# Patient Record
Sex: MALE | Race: WHITE | Employment: OTHER | URBAN - METROPOLITAN AREA
[De-identification: names, ages, dates, MRNs, and addresses within clinical notes are randomized per-mention and may not be internally consistent; named-entity substitution may affect disease eponyms.]

---

## 2017-01-01 ENCOUNTER — HOSPITAL ENCOUNTER (INPATIENT)
Age: 77
LOS: 2 days | Discharge: HOME | DRG: 812 | End: 2017-03-26
Attending: INTERNAL MEDICINE | Admitting: INTERNAL MEDICINE
Payer: MEDICARE

## 2017-01-01 ENCOUNTER — HOSPITAL ENCOUNTER (OUTPATIENT)
Dept: WOUND CARE | Age: 77
Discharge: HOME | End: 2017-07-24
Attending: PHYSICAL MEDICINE & REHABILITATION
Payer: MEDICARE

## 2017-01-01 ENCOUNTER — HOSPITAL ENCOUNTER (OUTPATIENT)
Dept: LAB | Age: 77
Discharge: HOME | DRG: 308 | End: 2017-09-05
Attending: INTERNAL MEDICINE
Payer: MEDICARE

## 2017-01-01 ENCOUNTER — HOSPITAL ENCOUNTER (INPATIENT)
Age: 77
LOS: 1 days | Discharge: HOME | DRG: 308 | End: 2017-09-09
Attending: INTERNAL MEDICINE | Admitting: INTERNAL MEDICINE
Payer: MEDICARE

## 2017-01-01 ENCOUNTER — HOSPITAL ENCOUNTER (OUTPATIENT)
Dept: WOUND CARE | Age: 77
Discharge: HOME | End: 2017-08-28
Attending: PHYSICAL MEDICINE & REHABILITATION
Payer: MEDICARE

## 2017-01-01 ENCOUNTER — APPOINTMENT (OUTPATIENT)
Dept: GENERAL RADIOLOGY | Age: 77
DRG: 308 | End: 2017-01-01
Attending: NURSE PRACTITIONER
Payer: MEDICARE

## 2017-01-01 VITALS
HEART RATE: 70 BPM | RESPIRATION RATE: 16 BRPM | HEIGHT: 67 IN | SYSTOLIC BLOOD PRESSURE: 83 MMHG | WEIGHT: 219.1 LBS | TEMPERATURE: 96.7 F | DIASTOLIC BLOOD PRESSURE: 51 MMHG | OXYGEN SATURATION: 99 % | BODY MASS INDEX: 34.39 KG/M2

## 2017-01-01 VITALS
DIASTOLIC BLOOD PRESSURE: 67 MMHG | BODY MASS INDEX: 34.75 KG/M2 | TEMPERATURE: 96.8 F | RESPIRATION RATE: 18 BRPM | OXYGEN SATURATION: 96 % | SYSTOLIC BLOOD PRESSURE: 110 MMHG | WEIGHT: 221.9 LBS | HEART RATE: 67 BPM

## 2017-01-01 DIAGNOSIS — I50.22 CHRONIC SYSTOLIC CONGESTIVE HEART FAILURE (HCC): ICD-10-CM

## 2017-01-01 DIAGNOSIS — I50.22 CHRONIC SYSTOLIC HEART FAILURE (HCC): Primary | ICD-10-CM

## 2017-01-01 LAB
ABO + RH BLD: NORMAL
ALBUMIN SERPL BCP-MCNC: 3.7 G/DL (ref 3.2–4.6)
ALBUMIN/GLOB SERPL: 1.3 {RATIO} (ref 1.2–3.5)
ALP SERPL-CCNC: 179 U/L (ref 50–136)
ALT SERPL-CCNC: 22 U/L (ref 12–65)
ANION GAP BLD CALC-SCNC: 11 MMOL/L (ref 7–16)
ANION GAP BLD CALC-SCNC: 9 MMOL/L (ref 7–16)
ANION GAP BLD CALC-SCNC: 9 MMOL/L (ref 7–16)
ANION GAP SERPL CALC-SCNC: 10 MMOL/L (ref 7–16)
ANION GAP SERPL CALC-SCNC: 10 MMOL/L (ref 7–16)
ANION GAP SERPL CALC-SCNC: 11 MMOL/L (ref 7–16)
ANION GAP SERPL CALC-SCNC: 8 MMOL/L
AST SERPL W P-5'-P-CCNC: 33 U/L (ref 15–37)
BACTERIA SPEC ANAEROBE CULT: NORMAL
BACTERIA SPEC CULT: ABNORMAL
BACTERIA SPEC CULT: NORMAL
BASOPHILS # BLD AUTO: 0 K/UL (ref 0–0.2)
BASOPHILS # BLD: 0 % (ref 0–2)
BASOPHILS # BLD: 0 K/UL (ref 0–0.2)
BASOPHILS NFR BLD: 0 % (ref 0–2)
BILIRUB SERPL-MCNC: 0.6 MG/DL (ref 0.2–1.1)
BLD PROD TYP BPU: NORMAL
BLD PROD TYP BPU: NORMAL
BLOOD GROUP ANTIBODIES SERPL: NORMAL
BNP SERPL-MCNC: 703 PG/ML
BPU ID: NORMAL
BPU ID: NORMAL
BUN SERPL-MCNC: 18 MG/DL (ref 8–23)
BUN SERPL-MCNC: 18 MG/DL (ref 8–23)
BUN SERPL-MCNC: 19 MG/DL (ref 8–23)
BUN SERPL-MCNC: 21 MG/DL (ref 8–23)
BUN SERPL-MCNC: 31 MG/DL (ref 8–23)
BUN SERPL-MCNC: 38 MG/DL (ref 8–23)
BUN SERPL-MCNC: 40 MG/DL (ref 8–23)
CALCIUM SERPL-MCNC: 8.3 MG/DL (ref 8.3–10.4)
CALCIUM SERPL-MCNC: 8.4 MG/DL (ref 8.3–10.4)
CALCIUM SERPL-MCNC: 8.5 MG/DL (ref 8.3–10.4)
CALCIUM SERPL-MCNC: 8.6 MG/DL (ref 8.3–10.4)
CALCIUM SERPL-MCNC: 8.8 MG/DL (ref 8.3–10.4)
CHLORIDE SERPL-SCNC: 103 MMOL/L (ref 98–107)
CHLORIDE SERPL-SCNC: 104 MMOL/L (ref 98–107)
CHLORIDE SERPL-SCNC: 104 MMOL/L (ref 98–107)
CHLORIDE SERPL-SCNC: 107 MMOL/L (ref 98–107)
CHLORIDE SERPL-SCNC: 107 MMOL/L (ref 98–107)
CHLORIDE SERPL-SCNC: 108 MMOL/L (ref 98–107)
CHLORIDE SERPL-SCNC: 109 MMOL/L (ref 98–107)
CHOLEST SERPL-MCNC: 74 MG/DL
CO2 SERPL-SCNC: 22 MMOL/L (ref 21–32)
CO2 SERPL-SCNC: 24 MMOL/L (ref 21–32)
CO2 SERPL-SCNC: 25 MMOL/L (ref 21–32)
CO2 SERPL-SCNC: 26 MMOL/L (ref 21–32)
CREAT SERPL-MCNC: 0.85 MG/DL (ref 0.8–1.5)
CREAT SERPL-MCNC: 0.87 MG/DL (ref 0.8–1.5)
CREAT SERPL-MCNC: 0.97 MG/DL (ref 0.8–1.5)
CREAT SERPL-MCNC: 1 MG/DL (ref 0.8–1.5)
CREAT SERPL-MCNC: 1.23 MG/DL (ref 0.8–1.5)
CREAT SERPL-MCNC: 1.35 MG/DL (ref 0.8–1.5)
CREAT SERPL-MCNC: 1.46 MG/DL (ref 0.8–1.5)
CROSSMATCH RESULT,%XM: NORMAL
CROSSMATCH RESULT,%XM: NORMAL
DIFFERENTIAL METHOD BLD: ABNORMAL
DIFFERENTIAL METHOD BLD: ABNORMAL
EOSINOPHIL # BLD: 0.1 K/UL (ref 0–0.8)
EOSINOPHIL # BLD: 0.1 K/UL (ref 0–0.8)
EOSINOPHIL NFR BLD: 2 % (ref 0.5–7.8)
EOSINOPHIL NFR BLD: 2 % (ref 0.5–7.8)
ERYTHROCYTE [DISTWIDTH] IN BLOOD BY AUTOMATED COUNT: 16.2 % (ref 11.9–14.6)
ERYTHROCYTE [DISTWIDTH] IN BLOOD BY AUTOMATED COUNT: 16.3 % (ref 11.9–14.6)
ERYTHROCYTE [DISTWIDTH] IN BLOOD BY AUTOMATED COUNT: 16.5 % (ref 11.9–14.6)
ERYTHROCYTE [DISTWIDTH] IN BLOOD BY AUTOMATED COUNT: 18.4 % (ref 11.9–14.6)
ERYTHROCYTE [DISTWIDTH] IN BLOOD BY AUTOMATED COUNT: 18.9 % (ref 11.9–14.6)
ERYTHROCYTE [DISTWIDTH] IN BLOOD BY AUTOMATED COUNT: 19 % (ref 11.9–14.6)
FERRITIN SERPL-MCNC: 16 NG/ML (ref 8–388)
FOLATE SERPL-MCNC: 22.9 NG/ML (ref 3.1–17.5)
GLOBULIN SER CALC-MCNC: 2.9 G/DL (ref 2.3–3.5)
GLUCOSE BLD STRIP.AUTO-MCNC: 109 MG/DL (ref 65–100)
GLUCOSE BLD STRIP.AUTO-MCNC: 110 MG/DL (ref 65–100)
GLUCOSE BLD STRIP.AUTO-MCNC: 117 MG/DL (ref 65–100)
GLUCOSE BLD STRIP.AUTO-MCNC: 136 MG/DL (ref 65–100)
GLUCOSE BLD STRIP.AUTO-MCNC: 146 MG/DL (ref 65–100)
GLUCOSE BLD STRIP.AUTO-MCNC: 159 MG/DL (ref 65–100)
GLUCOSE BLD STRIP.AUTO-MCNC: 169 MG/DL (ref 65–100)
GLUCOSE BLD STRIP.AUTO-MCNC: 170 MG/DL (ref 65–100)
GLUCOSE BLD STRIP.AUTO-MCNC: 181 MG/DL (ref 65–100)
GLUCOSE BLD STRIP.AUTO-MCNC: 191 MG/DL (ref 65–100)
GLUCOSE BLD STRIP.AUTO-MCNC: 211 MG/DL (ref 65–100)
GLUCOSE BLD STRIP.AUTO-MCNC: 232 MG/DL (ref 65–100)
GLUCOSE BLD STRIP.AUTO-MCNC: 281 MG/DL (ref 65–100)
GLUCOSE BLD STRIP.AUTO-MCNC: 96 MG/DL (ref 65–100)
GLUCOSE SERPL-MCNC: 111 MG/DL (ref 65–100)
GLUCOSE SERPL-MCNC: 117 MG/DL (ref 65–100)
GLUCOSE SERPL-MCNC: 163 MG/DL (ref 65–100)
GLUCOSE SERPL-MCNC: 182 MG/DL (ref 65–100)
GLUCOSE SERPL-MCNC: 184 MG/DL (ref 65–100)
GLUCOSE SERPL-MCNC: 220 MG/DL (ref 65–100)
GLUCOSE SERPL-MCNC: 83 MG/DL (ref 65–100)
GRAM STN SPEC: ABNORMAL
GRAM STN SPEC: ABNORMAL
HCT VFR BLD AUTO: 25.5 % (ref 41.1–50.3)
HCT VFR BLD AUTO: 27.9 % (ref 41.1–50.3)
HCT VFR BLD AUTO: 29.6 % (ref 41.1–50.3)
HCT VFR BLD AUTO: 34.9 % (ref 41.1–50.3)
HCT VFR BLD AUTO: 35.5 % (ref 41.1–50.3)
HCT VFR BLD AUTO: 36.9 % (ref 41.1–50.3)
HDLC SERPL-MCNC: 19 MG/DL (ref 40–60)
HDLC SERPL: 3.9 {RATIO}
HEMOCCULT STL QL: NEGATIVE
HGB BLD-MCNC: 11.8 G/DL (ref 13.6–17.2)
HGB BLD-MCNC: 11.8 G/DL (ref 13.6–17.2)
HGB BLD-MCNC: 11.9 G/DL (ref 13.6–17.2)
HGB BLD-MCNC: 8.2 G/DL (ref 13.6–17.2)
HGB BLD-MCNC: 9.1 G/DL (ref 13.6–17.2)
HGB BLD-MCNC: 9.5 G/DL (ref 13.6–17.2)
HGB RETIC QN AUTO: 18 PG (ref 29–35)
IMM GRANULOCYTES # BLD: 0 K/UL (ref 0–0.5)
IMM GRANULOCYTES NFR BLD AUTO: 0.4 % (ref 0–5)
IMM RETICS NFR: 17.8 % (ref 2.3–13.4)
IRON SATN MFR SERPL: 5 %
IRON SERPL-MCNC: 18 UG/DL (ref 35–150)
IRON SERPL-MCNC: 28 UG/DL (ref 35–150)
LDLC SERPL CALC-MCNC: 22.6 MG/DL
LIPID PROFILE,FLP: ABNORMAL
LYMPHOCYTES # BLD AUTO: 19 % (ref 13–44)
LYMPHOCYTES # BLD: 0.9 K/UL (ref 0.5–4.6)
LYMPHOCYTES # BLD: 1.1 K/UL (ref 0.5–4.6)
LYMPHOCYTES NFR BLD: 14 % (ref 13–44)
MAGNESIUM SERPL-MCNC: 1.8 MG/DL (ref 1.8–2.4)
MAGNESIUM SERPL-MCNC: 2 MG/DL (ref 1.8–2.4)
MAGNESIUM SERPL-MCNC: 2 MG/DL (ref 1.8–2.4)
MAGNESIUM SERPL-MCNC: 2.1 MG/DL (ref 1.8–2.4)
MAGNESIUM SERPL-MCNC: 2.1 MG/DL (ref 1.8–2.4)
MAGNESIUM SERPL-MCNC: 2.2 MG/DL (ref 1.8–2.4)
MAGNESIUM SERPL-MCNC: 2.4 MG/DL (ref 1.8–2.4)
MCH RBC QN AUTO: 27.3 PG (ref 26.1–32.9)
MCH RBC QN AUTO: 27.8 PG (ref 26.1–32.9)
MCH RBC QN AUTO: 28.1 PG (ref 26.1–32.9)
MCH RBC QN AUTO: 29.4 PG (ref 26.1–32.9)
MCH RBC QN AUTO: 29.6 PG (ref 26.1–32.9)
MCH RBC QN AUTO: 30.1 PG (ref 26.1–32.9)
MCHC RBC AUTO-ENTMCNC: 32.1 G/DL (ref 31.4–35)
MCHC RBC AUTO-ENTMCNC: 32.2 G/DL (ref 31.4–35)
MCHC RBC AUTO-ENTMCNC: 32.2 G/DL (ref 31.4–35)
MCHC RBC AUTO-ENTMCNC: 32.6 G/DL (ref 31.4–35)
MCHC RBC AUTO-ENTMCNC: 33.2 G/DL (ref 31.4–35)
MCHC RBC AUTO-ENTMCNC: 33.8 G/DL (ref 31.4–35)
MCV RBC AUTO: 85.1 FL (ref 79.6–97.8)
MCV RBC AUTO: 85.3 FL (ref 79.6–97.8)
MCV RBC AUTO: 87.3 FL (ref 79.6–97.8)
MCV RBC AUTO: 87.5 FL (ref 79.6–97.8)
MCV RBC AUTO: 88.5 FL (ref 79.6–97.8)
MCV RBC AUTO: 93.2 FL (ref 79.6–97.8)
MONOCYTES # BLD: 0.3 K/UL (ref 0.1–1.3)
MONOCYTES # BLD: 0.5 K/UL (ref 0.1–1.3)
MONOCYTES NFR BLD AUTO: 6 % (ref 4–12)
MONOCYTES NFR BLD: 8 % (ref 4–12)
NEUTS SEG # BLD: 3.9 K/UL (ref 1.7–8.2)
NEUTS SEG # BLD: 5 K/UL (ref 1.7–8.2)
NEUTS SEG NFR BLD AUTO: 73 % (ref 43–78)
NEUTS SEG NFR BLD: 76 % (ref 43–78)
PLATELET # BLD AUTO: 81 K/UL (ref 150–450)
PLATELET # BLD AUTO: 83 K/UL (ref 150–450)
PLATELET # BLD AUTO: 85 K/UL (ref 150–450)
PLATELET # BLD AUTO: 86 K/UL (ref 150–450)
PLATELET # BLD AUTO: 91 K/UL (ref 150–450)
PLATELET # BLD AUTO: 95 K/UL (ref 150–450)
PMV BLD AUTO: 10 FL (ref 10.8–14.1)
PMV BLD AUTO: 10.3 FL (ref 10.8–14.1)
PMV BLD AUTO: 10.5 FL (ref 10.8–14.1)
PMV BLD AUTO: 10.7 FL (ref 10.8–14.1)
PMV BLD AUTO: 11 FL (ref 10.8–14.1)
PMV BLD AUTO: 11.1 FL (ref 10.8–14.1)
POTASSIUM SERPL-SCNC: 3.4 MMOL/L (ref 3.5–5.1)
POTASSIUM SERPL-SCNC: 3.9 MMOL/L (ref 3.5–5.1)
POTASSIUM SERPL-SCNC: 4.1 MMOL/L (ref 3.5–5.1)
POTASSIUM SERPL-SCNC: 4.1 MMOL/L (ref 3.5–5.1)
POTASSIUM SERPL-SCNC: 4.2 MMOL/L (ref 3.5–5.1)
POTASSIUM SERPL-SCNC: 4.7 MMOL/L (ref 3.5–5.1)
POTASSIUM SERPL-SCNC: 5.3 MMOL/L (ref 3.5–5.1)
PROT SERPL-MCNC: 6.6 G/DL (ref 6.3–8.2)
RBC # BLD AUTO: 2.92 M/UL (ref 4.23–5.67)
RBC # BLD AUTO: 3.27 M/UL (ref 4.23–5.67)
RBC # BLD AUTO: 3.48 M/UL (ref 4.23–5.67)
RBC # BLD AUTO: 3.96 M/UL (ref 4.23–5.67)
RBC # BLD AUTO: 3.99 M/UL (ref 4.23–5.67)
RBC # BLD AUTO: 4.01 M/UL (ref 4.23–5.67)
RETICS # AUTO: 0.06 M/UL (ref 0.03–0.1)
RETICS/RBC NFR AUTO: 1.9 % (ref 0.3–2)
SERVICE CMNT-IMP: ABNORMAL
SODIUM SERPL-SCNC: 140 MMOL/L (ref 136–145)
SODIUM SERPL-SCNC: 141 MMOL/L (ref 136–145)
SODIUM SERPL-SCNC: 142 MMOL/L (ref 136–145)
SOURCE, RSRC56: NORMAL
SPECIMEN EXP DATE BLD: NORMAL
SPECIMEN SOURCE: NORMAL
STATUS OF UNIT,%ST: NORMAL
STATUS OF UNIT,%ST: NORMAL
T4 SERPL-MCNC: 8.9 UG/DL (ref 4.8–13.9)
TIBC SERPL-MCNC: 398 UG/DL (ref 250–450)
TRANSFERRIN SERPL-MCNC: 310 MG/DL (ref 202–364)
TRIGL SERPL-MCNC: 162 MG/DL (ref 35–150)
TSH SERPL DL<=0.005 MIU/L-ACNC: 1.97 UIU/ML (ref 0.36–3.74)
UNIT DIVISION, %UDIV: 0
UNIT DIVISION, %UDIV: 0
VIT B12 SERPL-MCNC: 410 PG/ML (ref 193–986)
VLDLC SERPL CALC-MCNC: 32.4 MG/DL (ref 6–23)
WBC # BLD AUTO: 4.9 K/UL (ref 4.3–11.1)
WBC # BLD AUTO: 5.4 K/UL (ref 4.3–11.1)
WBC # BLD AUTO: 6.4 K/UL (ref 4.3–11.1)
WBC # BLD AUTO: 6.6 K/UL (ref 4.3–11.1)
WBC # BLD AUTO: 6.7 K/UL (ref 4.3–11.1)
WBC # BLD AUTO: 6.9 K/UL (ref 4.3–11.1)

## 2017-01-01 PROCEDURE — 99218 HC RM OBSERVATION: CPT

## 2017-01-01 PROCEDURE — 74011636637 HC RX REV CODE- 636/637: Performed by: INTERNAL MEDICINE

## 2017-01-01 PROCEDURE — 86900 BLOOD TYPING SEROLOGIC ABO: CPT | Performed by: PHYSICIAN ASSISTANT

## 2017-01-01 PROCEDURE — 87075 CULTR BACTERIA EXCEPT BLOOD: CPT | Performed by: PHYSICAL MEDICINE & REHABILITATION

## 2017-01-01 PROCEDURE — 82962 GLUCOSE BLOOD TEST: CPT

## 2017-01-01 PROCEDURE — 74011250637 HC RX REV CODE- 250/637: Performed by: INTERNAL MEDICINE

## 2017-01-01 PROCEDURE — 85027 COMPLETE CBC AUTOMATED: CPT | Performed by: NURSE PRACTITIONER

## 2017-01-01 PROCEDURE — 74011250636 HC RX REV CODE- 250/636: Performed by: NURSE PRACTITIONER

## 2017-01-01 PROCEDURE — 74011000258 HC RX REV CODE- 258: Performed by: INTERNAL MEDICINE

## 2017-01-01 PROCEDURE — 36415 COLL VENOUS BLD VENIPUNCTURE: CPT | Performed by: NURSE PRACTITIONER

## 2017-01-01 PROCEDURE — 71010 XR CHEST SNGL V: CPT

## 2017-01-01 PROCEDURE — 85027 COMPLETE CBC AUTOMATED: CPT | Performed by: PHYSICIAN ASSISTANT

## 2017-01-01 PROCEDURE — 74011250636 HC RX REV CODE- 250/636: Performed by: INTERNAL MEDICINE

## 2017-01-01 PROCEDURE — 77030018846 HC SOL IRR STRL H20 ICUM -A

## 2017-01-01 PROCEDURE — 82746 ASSAY OF FOLIC ACID SERUM: CPT | Performed by: PHYSICIAN ASSISTANT

## 2017-01-01 PROCEDURE — 83540 ASSAY OF IRON: CPT | Performed by: PHYSICIAN ASSISTANT

## 2017-01-01 PROCEDURE — C8929 TTE W OR WO FOL WCON,DOPPLER: HCPCS

## 2017-01-01 PROCEDURE — 85046 RETICYTE/HGB CONCENTRATE: CPT | Performed by: PHYSICIAN ASSISTANT

## 2017-01-01 PROCEDURE — 87205 SMEAR GRAM STAIN: CPT | Performed by: PHYSICAL MEDICINE & REHABILITATION

## 2017-01-01 PROCEDURE — 85025 COMPLETE CBC W/AUTO DIFF WBC: CPT | Performed by: PHYSICIAN ASSISTANT

## 2017-01-01 PROCEDURE — 83735 ASSAY OF MAGNESIUM: CPT | Performed by: PHYSICIAN ASSISTANT

## 2017-01-01 PROCEDURE — 99213 OFFICE O/P EST LOW 20 MIN: CPT

## 2017-01-01 PROCEDURE — P9016 RBC LEUKOCYTES REDUCED: HCPCS | Performed by: PHYSICIAN ASSISTANT

## 2017-01-01 PROCEDURE — 74011000250 HC RX REV CODE- 250: Performed by: INTERNAL MEDICINE

## 2017-01-01 PROCEDURE — 80048 BASIC METABOLIC PNL TOTAL CA: CPT | Performed by: PHYSICIAN ASSISTANT

## 2017-01-01 PROCEDURE — 30233N1 TRANSFUSION OF NONAUTOLOGOUS RED BLOOD CELLS INTO PERIPHERAL VEIN, PERCUTANEOUS APPROACH: ICD-10-PCS | Performed by: INTERNAL MEDICINE

## 2017-01-01 PROCEDURE — 83735 ASSAY OF MAGNESIUM: CPT | Performed by: NURSE PRACTITIONER

## 2017-01-01 PROCEDURE — 80048 BASIC METABOLIC PNL TOTAL CA: CPT | Performed by: NURSE PRACTITIONER

## 2017-01-01 PROCEDURE — 65660000000 HC RM CCU STEPDOWN

## 2017-01-01 PROCEDURE — 86923 COMPATIBILITY TEST ELECTRIC: CPT | Performed by: PHYSICIAN ASSISTANT

## 2017-01-01 PROCEDURE — 11043 DBRDMT MUSC&/FSCA 1ST 20/<: CPT

## 2017-01-01 PROCEDURE — 80061 LIPID PANEL: CPT | Performed by: PHYSICIAN ASSISTANT

## 2017-01-01 PROCEDURE — 80053 COMPREHEN METABOLIC PANEL: CPT | Performed by: PHYSICIAN ASSISTANT

## 2017-01-01 PROCEDURE — 82607 VITAMIN B-12: CPT | Performed by: PHYSICIAN ASSISTANT

## 2017-01-01 PROCEDURE — 84466 ASSAY OF TRANSFERRIN: CPT | Performed by: PHYSICIAN ASSISTANT

## 2017-01-01 PROCEDURE — 77030013131 HC IV BLD ST ICUM -A

## 2017-01-01 PROCEDURE — 77030019605

## 2017-01-01 PROCEDURE — 74011000250 HC RX REV CODE- 250

## 2017-01-01 PROCEDURE — C9113 INJ PANTOPRAZOLE SODIUM, VIA: HCPCS | Performed by: INTERNAL MEDICINE

## 2017-01-01 PROCEDURE — 36430 TRANSFUSION BLD/BLD COMPNT: CPT

## 2017-01-01 PROCEDURE — 11042 DBRDMT SUBQ TIS 1ST 20SQCM/<: CPT

## 2017-01-01 PROCEDURE — 36415 COLL VENOUS BLD VENIPUNCTURE: CPT | Performed by: PHYSICIAN ASSISTANT

## 2017-01-01 PROCEDURE — 82728 ASSAY OF FERRITIN: CPT | Performed by: PHYSICIAN ASSISTANT

## 2017-01-01 PROCEDURE — 99215 OFFICE O/P EST HI 40 MIN: CPT

## 2017-01-01 PROCEDURE — 74011250636 HC RX REV CODE- 250/636

## 2017-01-01 PROCEDURE — 87186 SC STD MICRODIL/AGAR DIL: CPT | Performed by: PHYSICAL MEDICINE & REHABILITATION

## 2017-01-01 PROCEDURE — 82272 OCCULT BLD FECES 1-3 TESTS: CPT | Performed by: PHYSICIAN ASSISTANT

## 2017-01-01 PROCEDURE — 77030022298 HC DRSG ANTIMIC ACTICT S&N -A

## 2017-01-01 PROCEDURE — 99214 OFFICE O/P EST MOD 30 MIN: CPT

## 2017-01-01 RX ORDER — MAGNESIUM SULFATE HEPTAHYDRATE 40 MG/ML
2 INJECTION, SOLUTION INTRAVENOUS ONCE
Status: COMPLETED | OUTPATIENT
Start: 2017-01-01 | End: 2017-01-01

## 2017-01-01 RX ORDER — GLIPIZIDE 5 MG/1
5 TABLET, FILM COATED, EXTENDED RELEASE ORAL 2 TIMES DAILY
Status: DISCONTINUED | OUTPATIENT
Start: 2017-01-01 | End: 2017-01-01

## 2017-01-01 RX ORDER — INSULIN GLARGINE 100 [IU]/ML
40 INJECTION, SOLUTION SUBCUTANEOUS
Status: DISCONTINUED | OUTPATIENT
Start: 2017-01-01 | End: 2017-01-01 | Stop reason: HOSPADM

## 2017-01-01 RX ORDER — GLIPIZIDE 5 MG/1
5 TABLET, FILM COATED, EXTENDED RELEASE ORAL
Status: DISCONTINUED | OUTPATIENT
Start: 2017-01-01 | End: 2017-01-01 | Stop reason: HOSPADM

## 2017-01-01 RX ORDER — CITALOPRAM 20 MG/1
20 TABLET, FILM COATED ORAL DAILY
Status: DISCONTINUED | OUTPATIENT
Start: 2017-01-01 | End: 2017-01-01

## 2017-01-01 RX ORDER — INSULIN LISPRO 100 [IU]/ML
INJECTION, SOLUTION INTRAVENOUS; SUBCUTANEOUS
Status: DISCONTINUED | OUTPATIENT
Start: 2017-01-01 | End: 2017-01-01 | Stop reason: HOSPADM

## 2017-01-01 RX ORDER — NITROGLYCERIN 0.4 MG/1
0.4 TABLET SUBLINGUAL
Status: DISCONTINUED | OUTPATIENT
Start: 2017-01-01 | End: 2017-01-01 | Stop reason: HOSPADM

## 2017-01-01 RX ORDER — ONDANSETRON 2 MG/ML
4 INJECTION INTRAMUSCULAR; INTRAVENOUS
Status: DISCONTINUED | OUTPATIENT
Start: 2017-01-01 | End: 2017-01-01 | Stop reason: HOSPADM

## 2017-01-01 RX ORDER — ATORVASTATIN CALCIUM 40 MG/1
40 TABLET, FILM COATED ORAL
Status: DISCONTINUED | OUTPATIENT
Start: 2017-01-01 | End: 2017-01-01 | Stop reason: HOSPADM

## 2017-01-01 RX ORDER — ACETAMINOPHEN 325 MG/1
650 TABLET ORAL
Status: DISCONTINUED | OUTPATIENT
Start: 2017-01-01 | End: 2017-01-01 | Stop reason: HOSPADM

## 2017-01-01 RX ORDER — ESOMEPRAZOLE MAGNESIUM 40 MG/1
40 CAPSULE, DELAYED RELEASE ORAL 2 TIMES DAILY
Qty: 180 CAP | Refills: 3 | Status: SHIPPED | OUTPATIENT
Start: 2017-01-01

## 2017-01-01 RX ORDER — SODIUM CHLORIDE 0.9 % (FLUSH) 0.9 %
5-10 SYRINGE (ML) INJECTION EVERY 8 HOURS
Status: DISCONTINUED | OUTPATIENT
Start: 2017-01-01 | End: 2017-01-01 | Stop reason: HOSPADM

## 2017-01-01 RX ORDER — LANOLIN ALCOHOL/MO/W.PET/CERES
325 CREAM (GRAM) TOPICAL
Qty: 90 TAB | Refills: 3 | Status: SHIPPED | OUTPATIENT
Start: 2017-01-01

## 2017-01-01 RX ORDER — AMIODARONE HYDROCHLORIDE 200 MG/1
200 TABLET ORAL 2 TIMES DAILY
Qty: 60 TAB | Refills: 6 | Status: SHIPPED | OUTPATIENT
Start: 2017-01-01

## 2017-01-01 RX ORDER — HYDROCODONE BITARTRATE AND ACETAMINOPHEN 5; 325 MG/1; MG/1
1 TABLET ORAL
Status: DISCONTINUED | OUTPATIENT
Start: 2017-01-01 | End: 2017-01-01 | Stop reason: HOSPADM

## 2017-01-01 RX ORDER — ESOMEPRAZOLE MAGNESIUM 40 MG/1
40 CAPSULE, DELAYED RELEASE ORAL 2 TIMES DAILY
Qty: 60 CAP | Refills: 11 | Status: SHIPPED | OUTPATIENT
Start: 2017-01-01 | End: 2017-01-01

## 2017-01-01 RX ORDER — ALLOPURINOL 300 MG/1
300 TABLET ORAL
Status: DISCONTINUED | OUTPATIENT
Start: 2017-01-01 | End: 2017-01-01 | Stop reason: HOSPADM

## 2017-01-01 RX ORDER — COLCHICINE 0.6 MG/1
0.6 TABLET ORAL AS NEEDED
COMMUNITY
End: 2017-01-01

## 2017-01-01 RX ORDER — FUROSEMIDE 10 MG/ML
40 INJECTION INTRAMUSCULAR; INTRAVENOUS ONCE
Status: COMPLETED | OUTPATIENT
Start: 2017-01-01 | End: 2017-01-01

## 2017-01-01 RX ORDER — LANOLIN ALCOHOL/MO/W.PET/CERES
325 CREAM (GRAM) TOPICAL
Qty: 30 TAB | Refills: 11 | Status: SHIPPED | OUTPATIENT
Start: 2017-01-01 | End: 2017-01-01

## 2017-01-01 RX ORDER — GLIPIZIDE 5 MG/1
5 TABLET, FILM COATED, EXTENDED RELEASE ORAL 2 TIMES DAILY
Status: DISCONTINUED | OUTPATIENT
Start: 2017-01-01 | End: 2017-01-01 | Stop reason: HOSPADM

## 2017-01-01 RX ORDER — GUAIFENESIN 100 MG/5ML
81 LIQUID (ML) ORAL DAILY
Status: DISCONTINUED | OUTPATIENT
Start: 2017-01-01 | End: 2017-01-01 | Stop reason: HOSPADM

## 2017-01-01 RX ORDER — SODIUM CHLORIDE 0.9 % (FLUSH) 0.9 %
5-10 SYRINGE (ML) INJECTION AS NEEDED
Status: DISCONTINUED | OUTPATIENT
Start: 2017-01-01 | End: 2017-01-01 | Stop reason: HOSPADM

## 2017-01-01 RX ORDER — SODIUM CHLORIDE 9 MG/ML
250 INJECTION, SOLUTION INTRAVENOUS AS NEEDED
Status: DISCONTINUED | OUTPATIENT
Start: 2017-01-01 | End: 2017-01-01 | Stop reason: HOSPADM

## 2017-01-01 RX ORDER — AMIODARONE HYDROCHLORIDE 200 MG/1
200 TABLET ORAL 2 TIMES DAILY
Status: DISCONTINUED | OUTPATIENT
Start: 2017-01-01 | End: 2017-01-01 | Stop reason: HOSPADM

## 2017-01-01 RX ORDER — CARVEDILOL 3.12 MG/1
3.12 TABLET ORAL 2 TIMES DAILY WITH MEALS
Status: DISCONTINUED | OUTPATIENT
Start: 2017-01-01 | End: 2017-01-01 | Stop reason: HOSPADM

## 2017-01-01 RX ORDER — LANOLIN ALCOHOL/MO/W.PET/CERES
325 CREAM (GRAM) TOPICAL
Status: DISCONTINUED | OUTPATIENT
Start: 2017-01-01 | End: 2017-01-01 | Stop reason: HOSPADM

## 2017-01-01 RX ORDER — GUAIFENESIN 100 MG/5ML
81 LIQUID (ML) ORAL DAILY
Status: SHIPPED | COMMUNITY
Start: 2017-01-01

## 2017-01-01 RX ORDER — AMOXICILLIN 500 MG/1
500 TABLET, FILM COATED ORAL 2 TIMES DAILY
COMMUNITY

## 2017-01-01 RX ORDER — CITALOPRAM 20 MG/1
20 TABLET, FILM COATED ORAL
Status: DISCONTINUED | OUTPATIENT
Start: 2017-01-01 | End: 2017-01-01 | Stop reason: HOSPADM

## 2017-01-01 RX ORDER — COLCHICINE 0.6 MG/1
0.6 TABLET ORAL AS NEEDED
Status: DISCONTINUED | OUTPATIENT
Start: 2017-01-01 | End: 2017-01-01 | Stop reason: HOSPADM

## 2017-01-01 RX ORDER — MORPHINE SULFATE 2 MG/ML
2 INJECTION, SOLUTION INTRAMUSCULAR; INTRAVENOUS
Status: DISCONTINUED | OUTPATIENT
Start: 2017-01-01 | End: 2017-01-01 | Stop reason: HOSPADM

## 2017-01-01 RX ORDER — FUROSEMIDE 10 MG/ML
40 INJECTION INTRAMUSCULAR; INTRAVENOUS EVERY 12 HOURS
Status: DISCONTINUED | OUTPATIENT
Start: 2017-01-01 | End: 2017-01-01

## 2017-01-01 RX ORDER — CARVEDILOL 3.12 MG/1
3.12 TABLET ORAL 2 TIMES DAILY WITH MEALS
Qty: 60 TAB | Refills: 6 | Status: SHIPPED | OUTPATIENT
Start: 2017-01-01

## 2017-01-01 RX ORDER — POTASSIUM CHLORIDE 20 MEQ/1
40 TABLET, EXTENDED RELEASE ORAL
Status: COMPLETED | OUTPATIENT
Start: 2017-01-01 | End: 2017-01-01

## 2017-01-01 RX ORDER — PANTOPRAZOLE SODIUM 40 MG/1
40 TABLET, DELAYED RELEASE ORAL
Status: DISCONTINUED | OUTPATIENT
Start: 2017-01-01 | End: 2017-01-01 | Stop reason: HOSPADM

## 2017-01-01 RX ORDER — PANTOPRAZOLE SODIUM 40 MG/1
40 TABLET, DELAYED RELEASE ORAL
Status: DISCONTINUED | OUTPATIENT
Start: 2017-01-01 | End: 2017-01-01

## 2017-01-01 RX ORDER — POTASSIUM CHLORIDE 750 MG/1
10 TABLET, EXTENDED RELEASE ORAL DAILY
Status: DISCONTINUED | OUTPATIENT
Start: 2017-01-01 | End: 2017-01-01 | Stop reason: HOSPADM

## 2017-01-01 RX ORDER — ATORVASTATIN CALCIUM 40 MG/1
40 TABLET, FILM COATED ORAL DAILY
Status: DISCONTINUED | OUTPATIENT
Start: 2017-01-01 | End: 2017-01-01 | Stop reason: HOSPADM

## 2017-01-01 RX ADMIN — SODIUM CHLORIDE 40 MG: 9 INJECTION INTRAMUSCULAR; INTRAVENOUS; SUBCUTANEOUS at 22:09

## 2017-01-01 RX ADMIN — GLIPIZIDE 5 MG: 5 TABLET, FILM COATED, EXTENDED RELEASE ORAL at 08:36

## 2017-01-01 RX ADMIN — Medication 10 ML: at 21:50

## 2017-01-01 RX ADMIN — INSULIN LISPRO 2 UNITS: 100 INJECTION, SOLUTION INTRAVENOUS; SUBCUTANEOUS at 12:14

## 2017-01-01 RX ADMIN — POTASSIUM CHLORIDE 10 MEQ: 10 TABLET, EXTENDED RELEASE ORAL at 08:32

## 2017-01-01 RX ADMIN — ALLOPURINOL 300 MG: 300 TABLET ORAL at 06:02

## 2017-01-01 RX ADMIN — Medication 10 ML: at 06:06

## 2017-01-01 RX ADMIN — POTASSIUM CHLORIDE 10 MEQ: 10 TABLET, EXTENDED RELEASE ORAL at 08:36

## 2017-01-01 RX ADMIN — ASPIRIN 81 MG 81 MG: 81 TABLET ORAL at 08:42

## 2017-01-01 RX ADMIN — ALLOPURINOL 300 MG: 300 TABLET ORAL at 08:43

## 2017-01-01 RX ADMIN — GLIPIZIDE 5 MG: 5 TABLET, FILM COATED, EXTENDED RELEASE ORAL at 17:28

## 2017-01-01 RX ADMIN — SODIUM CHLORIDE 40 MG: 9 INJECTION INTRAMUSCULAR; INTRAVENOUS; SUBCUTANEOUS at 08:30

## 2017-01-01 RX ADMIN — ATORVASTATIN CALCIUM 40 MG: 40 TABLET, FILM COATED ORAL at 10:51

## 2017-01-01 RX ADMIN — AMIODARONE HYDROCHLORIDE 200 MG: 200 TABLET ORAL at 08:43

## 2017-01-01 RX ADMIN — MAGNESIUM SULFATE HEPTAHYDRATE 2 G: 40 INJECTION, SOLUTION INTRAVENOUS at 17:48

## 2017-01-01 RX ADMIN — Medication 10 ML: at 16:00

## 2017-01-01 RX ADMIN — INSULIN LISPRO 2 UNITS: 100 INJECTION, SOLUTION INTRAVENOUS; SUBCUTANEOUS at 17:28

## 2017-01-01 RX ADMIN — PANTOPRAZOLE SODIUM 40 MG: 40 TABLET, DELAYED RELEASE ORAL at 06:02

## 2017-01-01 RX ADMIN — SODIUM CHLORIDE 40 MG: 9 INJECTION INTRAMUSCULAR; INTRAVENOUS; SUBCUTANEOUS at 17:35

## 2017-01-01 RX ADMIN — CARVEDILOL 3.12 MG: 3.12 TABLET, FILM COATED ORAL at 10:52

## 2017-01-01 RX ADMIN — GLIPIZIDE 5 MG: 5 TABLET, FILM COATED, EXTENDED RELEASE ORAL at 17:48

## 2017-01-01 RX ADMIN — FUROSEMIDE 40 MG: 10 INJECTION, SOLUTION INTRAMUSCULAR; INTRAVENOUS at 16:20

## 2017-01-01 RX ADMIN — Medication 10 ML: at 21:48

## 2017-01-01 RX ADMIN — DEXTROSE 150 MG: 50 INJECTION, SOLUTION INTRAVENOUS at 16:36

## 2017-01-01 RX ADMIN — CITALOPRAM HYDROBROMIDE 20 MG: 20 TABLET ORAL at 21:52

## 2017-01-01 RX ADMIN — Medication 5 ML: at 06:02

## 2017-01-01 RX ADMIN — GLIPIZIDE 5 MG: 5 TABLET, FILM COATED, EXTENDED RELEASE ORAL at 08:43

## 2017-01-01 RX ADMIN — AMIODARONE HYDROCHLORIDE 1 MG/MIN: 50 INJECTION, SOLUTION INTRAVENOUS at 16:36

## 2017-01-01 RX ADMIN — AMIODARONE HYDROCHLORIDE 0.5 MG/MIN: 50 INJECTION, SOLUTION INTRAVENOUS at 19:58

## 2017-01-01 RX ADMIN — INSULIN DETEMIR 41 UNITS: 100 INJECTION, SOLUTION SUBCUTANEOUS at 21:59

## 2017-01-01 RX ADMIN — GLIPIZIDE 5 MG: 5 TABLET, FILM COATED, EXTENDED RELEASE ORAL at 08:32

## 2017-01-01 RX ADMIN — CITALOPRAM HYDROBROMIDE 20 MG: 20 TABLET ORAL at 21:53

## 2017-01-01 RX ADMIN — ATORVASTATIN CALCIUM 40 MG: 40 TABLET, FILM COATED ORAL at 08:42

## 2017-01-01 RX ADMIN — ALLOPURINOL 300 MG: 300 TABLET ORAL at 08:32

## 2017-01-01 RX ADMIN — PERFLUTREN 1 ML: 6.52 INJECTION, SUSPENSION INTRAVENOUS at 09:00

## 2017-01-01 RX ADMIN — CARVEDILOL 3.12 MG: 3.12 TABLET, FILM COATED ORAL at 18:29

## 2017-01-01 RX ADMIN — INSULIN HUMAN 6 UNITS: 100 INJECTION, SOLUTION PARENTERAL at 16:19

## 2017-01-01 RX ADMIN — FUROSEMIDE 40 MG: 10 INJECTION, SOLUTION INTRAMUSCULAR; INTRAVENOUS at 10:53

## 2017-01-01 RX ADMIN — Medication 10 ML: at 21:52

## 2017-01-01 RX ADMIN — ATORVASTATIN CALCIUM 40 MG: 40 TABLET, FILM COATED ORAL at 21:53

## 2017-01-01 RX ADMIN — FERROUS SULFATE TAB 325 MG (65 MG ELEMENTAL FE) 325 MG: 325 (65 FE) TAB at 08:43

## 2017-01-01 RX ADMIN — Medication 5 ML: at 22:09

## 2017-01-01 RX ADMIN — AMIODARONE HYDROCHLORIDE 0.5 MG/MIN: 50 INJECTION, SOLUTION INTRAVENOUS at 04:23

## 2017-01-01 RX ADMIN — INSULIN DETEMIR 41 UNITS: 100 INJECTION, SOLUTION SUBCUTANEOUS at 22:32

## 2017-01-01 RX ADMIN — FERROUS SULFATE TAB 325 MG (65 MG ELEMENTAL FE) 325 MG: 325 (65 FE) TAB at 10:51

## 2017-01-01 RX ADMIN — INSULIN GLARGINE 40 UNITS: 100 INJECTION, SOLUTION SUBCUTANEOUS at 22:00

## 2017-01-01 RX ADMIN — GLIPIZIDE 5 MG: 5 TABLET, FILM COATED, EXTENDED RELEASE ORAL at 10:51

## 2017-01-01 RX ADMIN — ATORVASTATIN CALCIUM 40 MG: 40 TABLET, FILM COATED ORAL at 21:48

## 2017-01-01 RX ADMIN — CITALOPRAM HYDROBROMIDE 20 MG: 20 TABLET ORAL at 21:50

## 2017-01-01 RX ADMIN — CARVEDILOL 3.12 MG: 3.12 TABLET, FILM COATED ORAL at 08:43

## 2017-01-01 RX ADMIN — Medication 5 ML: at 18:20

## 2017-01-01 RX ADMIN — ALLOPURINOL 300 MG: 300 TABLET ORAL at 08:36

## 2017-01-01 RX ADMIN — POTASSIUM CHLORIDE 40 MEQ: 20 TABLET, EXTENDED RELEASE ORAL at 08:43

## 2017-01-01 RX ADMIN — ASPIRIN 81 MG 81 MG: 81 TABLET ORAL at 10:53

## 2017-01-01 RX ADMIN — INSULIN HUMAN 4 UNITS: 100 INJECTION, SOLUTION PARENTERAL at 11:55

## 2017-01-01 RX ADMIN — INSULIN LISPRO 2 UNITS: 100 INJECTION, SOLUTION INTRAVENOUS; SUBCUTANEOUS at 08:30

## 2017-01-01 RX ADMIN — INSULIN LISPRO 2 UNITS: 100 INJECTION, SOLUTION INTRAVENOUS; SUBCUTANEOUS at 21:59

## 2017-01-01 RX ADMIN — PANTOPRAZOLE SODIUM 40 MG: 40 TABLET, DELAYED RELEASE ORAL at 08:42

## 2017-01-01 RX ADMIN — INSULIN GLARGINE 40 UNITS: 100 INJECTION, SOLUTION SUBCUTANEOUS at 21:51

## 2017-01-01 RX ADMIN — FUROSEMIDE 40 MG: 10 INJECTION, SOLUTION INTRAMUSCULAR; INTRAVENOUS at 04:29

## 2017-01-01 RX ADMIN — GLIPIZIDE 5 MG: 5 TABLET, FILM COATED, EXTENDED RELEASE ORAL at 18:16

## 2017-03-24 PROBLEM — D64.9 ANEMIA: Status: ACTIVE | Noted: 2017-01-01

## 2017-03-24 NOTE — PROGRESS NOTES
Patient received to room 316 as direct admit. Patient oriented to room, call light and plan of care. ROBERT Cowan made aware of patient's arrival. Admission assessment completed. Admission skin assessment completed with second RN and reveals the following: Scattered bruises to bilateral upper extremities. Sacrum/coccyx pink and blanchable.

## 2017-03-24 NOTE — IP AVS SNAPSHOT
Petty Elvis 
 
 
 [address] [facility] [address] [facility] 
[phone] Patient: Mellisa Mejia Sr. MRN: [id number] TBV:[id number] You are allergic to the following Allergen Reactions Flagyl (Metronidazole) Rash Recent Documentation Height Weight BMI Smoking Status 1.702 m 99.4 kg 34.32 kg/m2 Former Smoker Emergency Contacts Name Discharge Info Relation Home Work Mobile Randi Burt  Spouse [3] [phone] Anastacio Burt  Son [22] [phone] About your hospitalization You were admitted on:  March 24, 2017 You last received care in the:  [facility] 3 TELEMETRY You were discharged on:  March 26, 2017 Unit phone number:  [phone] Why you were hospitalized Your primary diagnosis was:  Not on File Your diagnoses also included:  Anemia, Atrial Fibrillation (Hcc), Chronic Systolic Heart Failure (Hcc), Coronary Atherosclerosis Of Native Coronary Vessel, Diabetes (Hcc), Hld (Hyperlipidemia) Providers Seen During Your Hospitalizations Provider Role Specialty Primary office phone Alina Soria MD Attending Provider Cardiology [phone] Your Primary Care Physician (PCP) Primary Care Physician Office Phone Office Fax Maricruz Tobias [phone] Follow-up Information Follow up With Details Comments Contact Info Alina Soria MD  We will call pt with f/u appt  [address] [place] [address] 
[phone] Gee Gooden MD Schedule an appointment as soon as possible for a visit  [address] [place] [id number] 
[phone] Current Discharge Medication List  
  
START taking these medications Dose & Instructions Dispensing Information Comments Morning Noon Evening Bedtime  
 aspirin 81 mg chewable tablet Start taking on:  3/27/2017 Your last dose was: Your next dose is:    
   
   
 Dose:  81 mg Take 1 Tab by mouth daily. Refills:  0  
     
   
   
   
  
 ferrous sulfate 325 mg (65 mg iron) tablet Commonly known as:  Iron (Ferrous Sulfate) Your last dose was: Your next dose is:    
   
   
 Dose:  325 mg Take 1 Tab by mouth Daily (before breakfast). Quantity:  90 Tab Refills:  3 CONTINUE these medications which have CHANGED Dose & Instructions Dispensing Information Comments Morning Noon Evening Bedtime  
 esomeprazole 40 mg capsule Commonly known as:  Sujatha Lowery What changed:  when to take this Your last dose was: Your next dose is:    
   
   
 Dose:  40 mg Take 1 Cap by mouth two (2) times a day. Quantity:  180 Cap Refills:  3 CONTINUE these medications which have NOT CHANGED Dose & Instructions Dispensing Information Comments Morning Noon Evening Bedtime  
 allopurinol 300 mg tablet Commonly known as:  Maria Elena Bibber Your last dose was: Your next dose is:    
   
   
 Dose:  300 mg Take 300 mg by mouth every morning. Refills:  0  
     
   
   
   
  
 atorvastatin 40 mg tablet Commonly known as:  LIPITOR Your last dose was: Your next dose is:    
   
   
 Dose:  40 mg Take 40 mg by mouth daily. Refills:  0  
     
   
   
   
  
 citalopram 20 mg tablet Commonly known as:  Kathi Sol Your last dose was: Your next dose is:    
   
   
 Dose:  20 mg Take 20 mg by mouth nightly. Refills:  0  
     
   
   
   
  
 colchicine 0.6 mg tablet Your last dose was: Your next dose is:    
   
   
 Dose:  0.6 mg Take 0.6 mg by mouth as needed. Refills:  0  
     
   
   
   
  
 cpap machine kit Your last dose was: Your next dose is:    
   
   
 by Does Not Apply route. 12 cm h2o Refills:  0  
     
   
   
   
  
 glipiZIDE SR 5 mg CR tablet Commonly known as:  GLUCOTROL XL Your last dose was: Your next dose is:    
   
   
 Dose:  5 mg Take 1 Tab by mouth two (2) times a day. Quantity:  30 Tab Refills:  0 LEVEMIR FLEXPEN 100 unit/mL (3 mL) Inpn Generic drug:  insulin detemir Your last dose was: Your next dose is:    
   
   
 Dose:  41 Units 41 Units by SubCUTAneous route nightly. Patient states he takes 40 ml every night Refills:  0  
     
   
   
   
  
 potassium chloride 10 mEq tablet Commonly known as:  K-DUR, KLOR-CON Your last dose was: Your next dose is:    
   
   
 Dose:  10 mEq Take 10 mEq by mouth daily. Refills:  0 STOP taking these medications COREG 6.25 mg tablet Generic drug:  carvedilol Where to Get Your Medications Information on where to get these meds will be given to you by the nurse or doctor. ! Ask your nurse or doctor about these medications  
  esomeprazole 40 mg capsule  
 ferrous sulfate 325 mg (65 mg iron) tablet Discharge Instructions Anemia: Care Instructions Your Care Instructions Anemia is a low level of red blood cells, which carry oxygen throughout your body. Many things can cause anemia. Lack of iron is one of the most common causes. Your body needs iron to make hemoglobin, a substance in red blood cells that carries oxygen from the lungs to your body's cells. Without enough iron, the body produces fewer and smaller red blood cells. As a result, your body's cells do not get enough oxygen, and you feel tired and weak. And you may have trouble concentrating. Bleeding is the most common cause of a lack of iron. You may have heavy menstrual bleeding or bleeding caused by conditions such as ulcers, hemorrhoids, or cancer. Regular use of aspirin or other anti-inflammatory medicines (such as ibuprofen) also can cause bleeding in some people.  A lack of iron in your diet also can cause anemia, especially at times when the body needs more iron, such as during pregnancy, infancy, and the teen years. Your doctor may have prescribed iron pills. It may take several months of treatment for your iron levels to return to normal. Your doctor also may suggest that you eat foods that are rich in iron, such as meat and beans. There are many other causes of anemia. It is not always due to a lack of iron. Finding the specific cause of your anemia will help your doctor find the right treatment for you. Follow-up care is a key part of your treatment and safety. Be sure to make and go to all appointments, and call your doctor if you are having problems. It's also a good idea to know your test results and keep a list of the medicines you take. How can you care for yourself at home? · Take your medicines exactly as prescribed. Call your doctor if you think you are having a problem with your medicine. · If your doctor recommends iron pills, take them as directed: ¨ Try to take the pills on an empty stomach about 1 hour before or 2 hours after meals. But you may need to take iron with food to avoid an upset stomach. ¨ Do not take antacids or drink milk or caffeine drinks (such as coffee, tea, or cola) at the same time or within 2 hours of the time that you take your iron. They can make it hard for your body to absorb the iron. ¨ Vitamin C (from food or supplements) helps your body absorb iron. Try taking iron pills with a glass of orange juice or some other food that is high in vitamin C, such as citrus fruits. ¨ Iron pills may cause stomach problems, such as heartburn, nausea, diarrhea, constipation, and cramps. Be sure to drink plenty of fluids, and include fruits, vegetables, and fiber in your diet each day. Iron pills often make your bowel movements dark or green.  
¨ If you forget to take an iron pill, do not take a double dose of iron the next time you take a pill. ¨ Keep iron pills out of the reach of small children. An overdose of iron can be very dangerous. · Follow your doctor's advice about eating iron-rich foods. These include red meat, shellfish, poultry, eggs, beans, raisins, whole-grain bread, and leafy green vegetables. · Steam vegetables to help them keep their iron content. When should you call for help? Call 911 anytime you think you may need emergency care. For example, call if: 
· You have symptoms of a heart attack. These may include: ¨ Chest pain or pressure, or a strange feeling in the chest. 
¨ Sweating. ¨ Shortness of breath. ¨ Nausea or vomiting. ¨ Pain, pressure, or a strange feeling in the back, neck, jaw, or upper belly or in one or both shoulders or arms. ¨ Lightheadedness or sudden weakness. ¨ A fast or irregular heartbeat. After you call 911, the  may tell you to chew 1 adult-strength or 2 to 4 low-dose aspirin. Wait for an ambulance. Do not try to drive yourself. · You passed out (lost consciousness). Call your doctor now or seek immediate medical care if: 
· You have new or increased shortness of breath. · You are dizzy or lightheaded, or you feel like you may faint. · Your fatigue and weakness continue or get worse. · You have any abnormal bleeding, such as: 
¨ Nosebleeds. ¨ Vaginal bleeding that is different (heavier, more frequent, at a different time of the month) than what you are used to. ¨ Bloody or black stools, or rectal bleeding. ¨ Bloody or pink urine. Watch closely for changes in your health, and be sure to contact your doctor if: 
· You do not get better as expected. Where can you learn more? Go to http://paulo-saeed.info/. Enter R301 in the search box to learn more about \"Anemia: Care Instructions. \" Current as of: February 5, 2016 Content Version: 11.1 © 5261-8087 [employer].  Care instructions adapted under license by [address] (which disclaims liability or warranty for this information). If you have questions about a medical condition or this instruction, always ask your healthcare professional. [address] any warranty or liability for your use of this information. Heart Failure: Care Instructions Your Care Instructions Heart failure occurs when your heart does not pump as much blood as the body needs. Failure does not mean that the heart has stopped pumping but rather that it is not pumping as well as it should. Over time, this causes fluid buildup in your lungs and other parts of your body. Fluid buildup can cause shortness of breath, fatigue, swollen ankles, and other problems. By taking medicines regularly, reducing sodium (salt) in your diet, checking your weight every day, and making lifestyle changes, you can feel better and live longer. Follow-up care is a key part of your treatment and safety. Be sure to make and go to all appointments, and call your doctor if you are having problems. It's also a good idea to know your test results and keep a list of the medicines you take. How can you care for yourself at home? Medicines · Be safe with medicines. Take your medicines exactly as prescribed. Call your doctor if you think you are having a problem with your medicine. · Do not take any vitamins, over-the-counter medicine, or herbal products without talking to your doctor first. [place] not take ibuprofen (Advil or Motrin) and naproxen (Aleve) without talking to your doctor first. They could make your heart failure worse. · You may be taking some of the following medicine. ¨ Beta-blockers can slow heart rate, decrease blood pressure, and improve your condition. Taking a beta-blocker may lower your chance of needing to be hospitalized.  
¨ Angiotensin-converting enzyme inhibitors (ACEIs) reduce the heart's workload, lower blood pressure, and reduce swelling. Taking an ACEI may lower your chance of needing to be hospitalized again. ¨ Angiotensin II receptor blockers (ARBs) work like ACEIs. Your doctor may prescribe them instead of ACEIs. ¨ Diuretics, also called water pills, reduce swelling. ¨ Potassium supplements replace this important mineral, which is sometimes lost with diuretics. ¨ Aspirin and other blood thinners prevent blood clots, which can cause a stroke or heart attack. You will get more details on the specific medicines your doctor prescribes. Diet · Your doctor may suggest that you limit sodium to 2,000 milligrams (mg) a day or less. That is less than 1 teaspoon of salt a day, including all the salt you eat in cooking or in packaged foods. People get most of their sodium from processed foods. Fast food and restaurant meals also tend to be very high in sodium. · Ask your doctor how much liquid you can drink each day. You may have to limit liquids. Weight · Weigh yourself without clothing at the same time each day. Record your weight. Call your doctor if you gain more than 3 pounds in 2 to 3 days. A sudden weight gain may mean that your heart failure is getting worse. Activity level · Start light exercise (if your doctor says it is okay). Even if you can only do a small amount, exercise will help you get stronger, have more energy, and manage your weight and your stress. Walking is an easy way to get exercise. Start out by walking a little more than you did before. Bit by bit, increase the amount you walk. · When you exercise, watch for signs that your heart is working too hard. You are pushing yourself too hard if you cannot talk while you are exercising. If you become short of breath or dizzy or have chest pain, stop, sit down, and rest. 
· If you feel \"wiped out\" the day after you exercise, walk slower or for a shorter distance until you can work up to a better pace. · Get enough rest at night. Sleeping with 1 or 2 pillows under your upper body and head may help you breathe easier. Lifestyle changes · Do not smoke. Smoking can make a heart condition worse. If you need help quitting, talk to your doctor about stop-smoking programs and medicines. These can increase your chances of quitting for good. Quitting smoking may be the most important step you can take to protect your heart. · Limit alcohol to 2 drinks a day for men and 1 drink a day for women. Too much alcohol can cause health problems. · Avoid getting sick from colds and the flu. Get a pneumococcal vaccine shot. If you have had one before, ask your doctor whether you need another dose. Get a flu shot each year. If you must be around people with colds or the flu, wash your hands often. When should you call for help? Call 911 if you have symptoms of sudden heart failure such as: 
· You have severe trouble breathing. · You cough up pink, foamy mucus. · You have a new irregular or rapid heartbeat. Call your doctor now or seek immediate medical care if: 
· You have new or increased shortness of breath. · You are dizzy or lightheaded, or you feel like you may faint. · You have sudden weight gain, such as 3 pounds or more in 2 to 3 days. · You have increased swelling in your legs, ankles, or feet. · You are suddenly so tired or weak that you cannot do your usual activities. Watch closely for changes in your health, and be sure to contact your doctor if: 
· You develop new symptoms. Where can you learn more? Go to http://paulo-saeed.info/. Enter Q487 in the search box to learn more about \"Heart Failure: Care Instructions. \" Current as of: January 27, 2016 Content Version: 11.1 © 3826-3249 [employer]. Care instructions adapted under license by [employer] (which disclaims liability or warranty for this information).  If you have questions about a medical condition or this instruction, always ask your healthcare professional. [address] any warranty or liability for your use of this information. DISCHARGE SUMMARY from Nurse The following personal items are in your possession at time of discharge: 
 
  
Visual Aid: None Jewelry: Ring, With patient Clothing: Pants, Shirt, Undergarments, With patient PATIENT INSTRUCTIONS: 
 
 
F-face looks uneven A-arms unable to move or move unevenly S-speech slurred or non-existent T-time-call 911 as soon as signs and symptoms begin-DO NOT go Back to bed or wait to see if you get better-TIME IS BRAIN. Warning Signs of HEART ATTACK Call 911 if you have these symptoms: 
? Chest discomfort. Most heart attacks involve discomfort in the center of the chest that lasts more than a few minutes, or that goes away and comes back. It can feel like uncomfortable pressure, squeezing, fullness, or pain. ? Discomfort in other areas of the upper body. Symptoms can include pain or discomfort in one or both arms, the back, neck, jaw, or stomach. ? Shortness of breath with or without chest discomfort. ? Other signs may include breaking out in a cold sweat, nausea, or lightheadedness. Don't wait more than five minutes to call [address]! Fast action can save your life. Calling 911 is almost always the fastest way to get lifesaving treatment. Emergency Medical Services staff can begin treatment when they arrive  up to an hour sooner than if someone gets to the hospital by car. The discharge information has been reviewed with the patient. The patient verbalized understanding. Discharge medications reviewed with the patient and appropriate educational materials and side effects teaching were provided. Discharge Orders Procedure Order Date Status Priority Quantity Spec Type Associated Dx HGB & HCT 03/26/17 0827 Future Routine 1 Whole Blood Comments:  Dx: Iron def anemia Introducing [facility]! Mariaa Ferrer introduces [employer] patient portal. Now you can access parts of your medical record, email your doctor's office, and request medication refills online. 1. In your internet browser, go to [email or web address] 2. Click on the First Time User? Click Here link in the Sign In box. You will see the New Member Sign Up page. 3. Enter your [employer] Access Code exactly as it appears below. You will not need to use this code after youve completed the sign-up process. If you do not sign up before the expiration date, you must request a new code. · [employer] Access Code: [email or web address] Expires: 6/15/2017  9:46 AM 
 
4. Enter the last four digits of your Social Security Number (xxxx) and Date of Birth (mm/dd/yyyy) as indicated and click Submit. You will be taken to the next sign-up page. 5. Create a [employer] ID. This will be your [employer] login ID and cannot be changed, so think of one that is secure and easy to remember. 6. Create a [employer] password. You can change your password at any time. 7. Enter your Password Reset Question and Answer. This can be used at a later time if you forget your password. 8. Enter your e-mail address. You will receive e-mail notification when new information is available in [address] 9. Click Sign Up. You can now view and download portions of your medical record. 10. Click the Download Summary menu link to download a portable copy of your medical information. If you have questions, please visit the Frequently Asked Questions section of the [employer] website. Remember, [employer] is NOT to be used for urgent needs. For medical emergencies, dial 911. Now available from your iPhone and Android! General Information Please provide this summary of care documentation to your next provider. Patient Signature:  ____________________________________________________________ Date:  ____________________________________________________________  
  
Marvetta Land Provider Signature:  ____________________________________________________________ Date:  ____________________________________________________________

## 2017-03-24 NOTE — PROGRESS NOTES
Problem: Falls - Risk of  Goal: *Absence of falls  Outcome: Progressing Towards Goal  Bed in low, locked position. Bed rails up x3, call light within reach, and non-skid socks in place. Verbalizes understanding to call for any assistance. Problem: Anemia Care Plan (Adult and Pediatric)  Goal: *Labs within defined limits  Outcome: Progressing Towards Goal  Hgb 8.2. Orders to transfuse 2 units PRBCs.

## 2017-03-24 NOTE — IP AVS SNAPSHOT
Current Discharge Medication List  
  
START taking these medications Dose & Instructions Dispensing Information Comments Morning Noon Evening Bedtime  
 aspirin 81 mg chewable tablet Start taking on:  3/27/2017 Your last dose was: Your next dose is:    
   
   
 Dose:  81 mg Take 1 Tab by mouth daily. Refills:  0  
     
   
   
   
  
 ferrous sulfate 325 mg (65 mg iron) tablet Commonly known as:  Iron (Ferrous Sulfate) Your last dose was: Your next dose is:    
   
   
 Dose:  325 mg Take 1 Tab by mouth Daily (before breakfast). Quantity:  90 Tab Refills:  3 CONTINUE these medications which have CHANGED Dose & Instructions Dispensing Information Comments Morning Noon Evening Bedtime  
 esomeprazole 40 mg capsule Commonly known as:  Lupis Martinez What changed:  when to take this Your last dose was: Your next dose is:    
   
   
 Dose:  40 mg Take 1 Cap by mouth two (2) times a day. Quantity:  180 Cap Refills:  3 CONTINUE these medications which have NOT CHANGED Dose & Instructions Dispensing Information Comments Morning Noon Evening Bedtime  
 allopurinol 300 mg tablet Commonly known as:  Carina Gosling Your last dose was: Your next dose is:    
   
   
 Dose:  300 mg Take 300 mg by mouth every morning. Refills:  0  
     
   
   
   
  
 atorvastatin 40 mg tablet Commonly known as:  LIPITOR Your last dose was: Your next dose is:    
   
   
 Dose:  40 mg Take 40 mg by mouth daily. Refills:  0  
     
   
   
   
  
 citalopram 20 mg tablet Commonly known as:  Tempie Booker Your last dose was: Your next dose is:    
   
   
 Dose:  20 mg Take 20 mg by mouth nightly. Refills:  0  
     
   
   
   
  
 colchicine 0.6 mg tablet Your last dose was:     
   
Your next dose is:    
   
   
 Dose:  0.6 mg  
 Take 0.6 mg by mouth as needed. Refills:  0  
     
   
   
   
  
 cpap machine kit Your last dose was: Your next dose is:    
   
   
 by Does Not Apply route. 12 cm h2o Refills:  0  
     
   
   
   
  
 glipiZIDE SR 5 mg CR tablet Commonly known as:  GLUCOTROL XL Your last dose was: Your next dose is:    
   
   
 Dose:  5 mg Take 1 Tab by mouth two (2) times a day. Quantity:  30 Tab Refills:  0 LEVEMIR FLEXPEN 100 unit/mL (3 mL) Inpn Generic drug:  insulin detemir Your last dose was: Your next dose is:    
   
   
 Dose:  41 Units 41 Units by SubCUTAneous route nightly. Patient states he takes 40 ml every night Refills:  0  
     
   
   
   
  
 potassium chloride 10 mEq tablet Commonly known as:  K-DUR, KLOR-CON Your last dose was: Your next dose is:    
   
   
 Dose:  10 mEq Take 10 mEq by mouth daily. Refills:  0 STOP taking these medications COREG 6.25 mg tablet Generic drug:  carvedilol Where to Get Your Medications Information on where to get these meds will be given to you by the nurse or doctor. ! Ask your nurse or doctor about these medications  
  esomeprazole 40 mg capsule  
 ferrous sulfate 325 mg (65 mg iron) tablet

## 2017-03-24 NOTE — PROGRESS NOTES
Verbal bedside report given to [address] oncoming RN. Patient's situation, background, assessment and recommendations provided. Opportunity for questions provided. Oncoming RN assumed care of patient.

## 2017-03-24 NOTE — CONSULTS
[facility] Consult Note       Primary GI Physician: Laure Yuan    Referring Physician:  Brionna Fitzpatrick    Consult Date:  3/24/2017    Admit Date:  3/24/2017    Chief Complaint:  GI bleed    Subjective:     History of Present Illness:  Patient is a 78 y.o. male with PMH of below, who is seen in consultation at the request of Dr. Brionna Fitzpatrick for GI bleed. The patient was last seen for the same in October 2015 at [facility].. He has been on ASA and Xarelto for severe cardiac disease. About 4 weeks ago, his stool turned black. The pattern did not change. He waited until his appointment with Dr. Liza Farrar a few days ago to seek care. He was found to be significantly anemic, and he was admitted to Dr. Brionna Fitzpatrick. He stopped ASA and Xarelto about 5 days ago, and his stool is now normal brown again. He has not had any significant abdominal pain, nausea, indigestion, or heartburn. He has taken no OTC NSAIDs. His colonoscopy evaluations have traditionally been with Dr. Danny Murrieta at [facility], who last told him he had a little diverticular disease but nothing else and did not need any further colonoscopy screening. PMH:  Past Medical History:   Diagnosis Date    AICD (automatic cardioverter/defibrillator) present     ARF (acute renal failure) ([address] 5/23/2010    Arthritis     Atrial fibrillation (HCC)     CAD (coronary artery disease)     MI 1981; CABG 1981 & 1991; stents 2001 & 2010    Cancer [facility]) 2011    melanoma    Chest pain 5/23/2010    Chronic systolic heart failure ([address] 12/12/2013    Coronary atherosclerosis of artery bypass graft 12/12/2013    Coronary atherosclerosis of native coronary vessel     Diabetes ([address] 5/23/2010    Diabetes mellitus type 2, insulin dependent ([address] type 2    avg fasting sqbs 145; denies s/s hypo; last a1c 8.3    Difficult intubation     Dyslipidemia     GERD (gastroesophageal reflux disease)     Gout, chronic 5/23/2010    Heart failure ([address]     EF 25-30%on ECHO 8/2010;  Biotronik ICD 2010    HLD (hyperlipidemia) 5/23/2010    Hypertension     controlled with meds    Ill-defined condition     hyperlipidemia    Morbid obesity ([facility]     Post PTCA/stent     Psychiatric disorder     depression/anxiety    PUD (peptic ulcer disease)     S/P CABG (coronary artery bypass graft)     Sleep apnea     Unspecified sleep apnea     sleeps with cpap       PSH:  Past Surgical History:   Procedure Laterality Date    CABG, ARTERY-VEIN, THREE  1981    CABG, ARTERY-VEIN, THREE  1991    CARDIAC SURG PROCEDURE UNLIST  last one 5/2010 2/2010; 5/2010 LAD/SVG    HX CATARACT REMOVAL  2009    bilateral with IOL    HX CHOLECYSTECTOMY      HX COLONOSCOPY      HX GI      hemorrhoidectomy    HX HEENT      multiple eye surgeries to remove muscle    HX HEENT      throat surgery    HX IMPLANTABLE CARDIOVERTER DEFIBRILLATOR  9/2010    Biotronik ICD    HX KNEE ARTHROSCOPY      knee scope    HX PACEMAKER      HX PACEMAKER  12/12/2013    Biotronik BiV ICD       Allergies: Allergies   Allergen Reactions    Flagyl [Metronidazole] Rash       Home Medications:  Prior to Admission medications    Medication Sig Start Date End Date Taking? Authorizing Provider   esomeprazole (NEXIUM) 40 mg capsule Take 40 mg by mouth daily. 3/16/17  Yes Historical Provider   colchicine 0.6 mg tablet as needed. 8/5/16  Yes Historical Provider   atorvastatin (LIPITOR) 40 mg tablet Take 40 mg by mouth daily. Yes Historical Provider   citalopram (CELEXA) 20 mg tablet Take 20 mg by mouth daily. Yes Historical Provider   insulin detemir (LEVEMIR FLEXPEN) 100 unit/mL (3 mL) pen 41 Units by SubCUTAneous route nightly. Patient states he takes 40 ml every night   Yes Historical Provider   carvedilol (COREG) 6.25 mg tablet Take 6.25 mg by mouth two (2) times daily (with meals). Yes Historical Provider   glipiZIDE SR (GLUCOTROL XL) 5 mg CR tablet Take 1 Tab by mouth two (2) times a day.  5/25/10  Yes Edil Cespedes NP allopurinol (ZYLOPRIM) 300 mg tablet Take 300 mg by mouth every morning. Yes Phys Other, MD   potassium chloride (K-DUR, KLOR-CON) 10 mEq tablet Take 10 mEq by mouth daily. 5/23/10  Yes Shani Other, MD   cpap machine kit by Does Not Apply route.  12 cm h2o    Historical Provider       Hospital Medications:  Current Facility-Administered Medications   Medication Dose Route Frequency    [START ON 3/25/2017] allopurinol (ZYLOPRIM) tablet 300 mg  300 mg Oral 7am    atorvastatin (LIPITOR) tablet 40 mg  40 mg Oral QHS    [START ON 3/25/2017] citalopram (CELEXA) tablet 20 mg  20 mg Oral DAILY    glipiZIDE SR (GLUCOTROL XL) tablet 5 mg  5 mg Oral BID    insulin detemir (LEVEMIR) injection 41 Units  41 Units SubCUTAneous QHS    [START ON 3/25/2017] potassium chloride (K-DUR, KLOR-CON) tablet 10 mEq  10 mEq Oral DAILY    0.9% sodium chloride infusion 250 mL  250 mL IntraVENous PRN    sodium chloride (NS) flush 5-10 mL  5-10 mL IntraVENous Q8H    sodium chloride (NS) flush 5-10 mL  5-10 mL IntraVENous PRN    nitroglycerin (NITROSTAT) tablet 0.4 mg  0.4 mg SubLINGual Q5MIN PRN    morphine injection 2 mg  2 mg IntraVENous Q4H PRN    ondansetron (ZOFRAN) injection 4 mg  4 mg IntraVENous Q4H PRN    acetaminophen (TYLENOL) tablet 650 mg  650 mg Oral Q4H PRN    furosemide (LASIX) injection 40 mg  40 mg IntraVENous ONCE    insulin lispro (HUMALOG) injection   SubCUTAneous AC&HS    pantoprazole (PROTONIX) 40 mg in sodium chloride 0.9 % 10 mL injection  40 mg IntraVENous Q12H       Social History:  Social History   Substance Use Topics    Smoking status: Former Smoker     Packs/day: 1.00     Years: 20.00     Quit date: 1/1/1981    Smokeless tobacco: Never Used      Comment: cigarette and pipe smoker    Alcohol use No         Family History:  Family History   Problem Relation Age of Onset    Cancer Mother     Heart Disease Father     Heart Disease Sister        Review of Systems:  A detailed 10 system ROS is obtained, with pertinent positives as listed above. All others are negative. Objective:     Physical Exam:  Vitals:  Visit Vitals    BP (!) 76/51 (BP 1 Location: Right arm, BP Patient Position: At rest)    Pulse 88    Temp 97 °F (36.1 °C)    Resp 20    Ht 5' 7\" (1.702 m)    Wt 102.2 kg (225 lb 3.2 oz)    SpO2 100%    BMI 35.27 kg/m2     Gen:  Pt is alert, cooperative, no acute distress  Skin:  Extremities and face reveal no rashes. HEENT: Sclerae anicteric. Extra-occular muscles are intact. No oral ulcers. No abnormal pigmentation of the lips. The neck is supple. Cardiovascular: Regular rate and rhythm. No murmurs, gallops, or rubs. Respiratory:  Comfortable breathing with no accessory muscle use. Clear breath sounds anteriorly with no wheezes, rales, or rhonchi. GI:  Abdomen nondistended, soft, and nontender. Normal active bowel sounds. No enlargement of the liver or spleen. No masses palpable. Rectal:  Deferred  Musculoskeletal:  Trace edema  Neurological:  Gross memory appears intact. Patient is alert and oriented. Psychiatric:  Mood appears appropriate with judgement intact. Lymphatic:  No cervical or supraclavicular adenopathy. Laboratory:    Recent Labs      03/24/17   1535   WBC  5.4   HGB  8.2*   HCT  25.5*   PLT  86*   MCV  87.3   NA  141   K  5.3*   CL  108*   CO2  22   BUN  40*   CREA  1.46   CA  8.4   MG  2.4   GLU  184*   AP  PENDING   SGOT  PENDING   ALT  22   TBILI  PENDING   ALB  3.7   TP  PENDING          Assessment:     Active Problems:    Anemia (3/24/2017)-the patient has significant anemia but also thrombocytopenia. His BUN is consistent with recent upper GI bleeding, although this would not explain his platelet count. He is hypotensive, but I understand this is his baseline, and he is asymptomatic to this. EGD for melena in October 2015 was negative. I do not have access to his prior colonoscopy reports.         Plan:     He is frightened of sedation due to his heart status and a prior bad experience. He does not want EGD. His bleeding appears to have stopped. Although I recommend EGD, he may be able to be managed medically. I will use high dose IV PPI. I think transfusion is reasonable. I would do this slowly due to his heart h/o and risk for pulmonary edema. I will follow with the primary team and plan to rediscuss EGD if there is further bleeding.     Arnie Hernandez MD

## 2017-03-24 NOTE — H&P
[facility] Cardiology History & Physical      Date of  Admission: 3/24/2017  2:58 PM     Primary Care Physician: Dr. Holden Boswell  Primary Cardiologist: Dr. Angus Garcia  Referring Physician: Dr. Renny Zarco Physician: Dr. Angus Garcia    CC: melena, anemia    HPI:  Jan Cuevas is a 78 y.o. male with h/o AF, CAD s/p CABG/PCI, DM II, HTN, dyslipidemia, chronic S-CHF EF 25-30% s/p Bitronik ICD, PUD and GERD who developed black tarry stools about 3 weeks ago. His Hgb was down to 9 and his Xarelto was stopped about 5 days ago. [facility] see [facility] for evaluation. He was seen by his PCP today and Hgb down to 7.9. He c/o dizziness, fatigue and SOB. He was directly admitted for transfusion, GI consult and monitoring of chronic S-CHF with PRBC's. He denies CP, palpitations or syncope. Past Medical History:   Diagnosis Date    AICD (automatic cardioverter/defibrillator) present     ARF (acute renal failure) ([address] 5/23/2010    Arthritis     Atrial fibrillation (HCC)     CAD (coronary artery disease)     MI 1981; CABG 1981 & 1991; stents 2001 & 2010    Cancer [facility]) 2011    melanoma    Chest pain 5/23/2010    Chronic systolic heart failure ([address] 12/12/2013    Coronary atherosclerosis of artery bypass graft 12/12/2013    Coronary atherosclerosis of native coronary vessel     Diabetes ([address] 5/23/2010    Diabetes mellitus type 2, insulin dependent ([address] type 2    avg fasting sqbs 145; denies s/s hypo; last a1c 8.3    Difficult intubation     Dyslipidemia     GERD (gastroesophageal reflux disease)     Gout, chronic 5/23/2010    Heart failure ([address]     EF 25-30%on ECHO 8/2010;  Biotronik ICD 2010    HLD (hyperlipidemia) 5/23/2010    Hypertension     controlled with meds    Ill-defined condition     hyperlipidemia    Morbid obesity ([address]     Post PTCA/stent     Psychiatric disorder     depression/anxiety    PUD (peptic ulcer disease)     S/P CABG (coronary artery bypass graft)  Sleep apnea     Unspecified sleep apnea     sleeps with cpap      Past Surgical History:   Procedure Laterality Date    CABG, ARTERY-VEIN, THREE  1981    CABG, ARTERY-VEIN, THREE  1991    CARDIAC SURG PROCEDURE UNLIST  last one 5/2010 2/2010; 5/2010 LAD/SVG    HX CATARACT REMOVAL  2009    bilateral with IOL    HX CHOLECYSTECTOMY      HX COLONOSCOPY      HX GI      hemorrhoidectomy    HX HEENT      multiple eye surgeries to remove muscle    HX HEENT      throat surgery    HX IMPLANTABLE CARDIOVERTER DEFIBRILLATOR  9/2010    Biotronik ICD    HX KNEE ARTHROSCOPY      knee scope    HX PACEMAKER      HX PACEMAKER  12/12/2013    Biotronik BiV ICD       Allergies   Allergen Reactions    Flagyl [Metronidazole] Rash      Social History     Social History    Marital status:      Spouse name: N/A    Number of children: N/A    Years of education: N/A     Occupational History    Not on file.      Social History Main Topics    Smoking status: Former Smoker     Packs/day: 1.00     Years: 20.00     Quit date: 1/1/1981    Smokeless tobacco: Never Used      Comment: cigarette and pipe smoker    Alcohol use No    Drug use: No    Sexual activity: Not on file     Other Topics Concern    Not on file     Social History Narrative     Family History   Problem Relation Age of Onset    Cancer Mother     Heart Disease Father     Heart Disease Sister         Current Facility-Administered Medications   Medication Dose Route Frequency    [START ON 3/25/2017] allopurinol (ZYLOPRIM) tablet 300 mg  300 mg Oral 7am    atorvastatin (LIPITOR) tablet 40 mg  40 mg Oral QHS    [START ON 3/25/2017] citalopram (CELEXA) tablet 20 mg  20 mg Oral DAILY    pantoprazole (PROTONIX) tablet 40 mg  40 mg Oral ACB&D    glipiZIDE SR (GLUCOTROL XL) tablet 5 mg  5 mg Oral BID    insulin detemir (LEVEMIR) injection 41 Units  41 Units SubCUTAneous QHS    [START ON 3/25/2017] potassium chloride (K-DUR, KLOR-CON) tablet 10 mEq 10 mEq Oral DAILY    0.9% sodium chloride infusion 250 mL  250 mL IntraVENous PRN    sodium chloride (NS) flush 5-10 mL  5-10 mL IntraVENous Q8H    sodium chloride (NS) flush 5-10 mL  5-10 mL IntraVENous PRN    nitroglycerin (NITROSTAT) tablet 0.4 mg  0.4 mg SubLINGual Q5MIN PRN    morphine injection 2 mg  2 mg IntraVENous Q4H PRN    ondansetron (ZOFRAN) injection 4 mg  4 mg IntraVENous Q4H PRN    acetaminophen (TYLENOL) tablet 650 mg  650 mg Oral Q4H PRN    furosemide (LASIX) injection 40 mg  40 mg IntraVENous ONCE    insulin lispro (HUMALOG) injection   SubCUTAneous AC&HS       Review of symptoms:  General: no recent weight loss/gain, +weakness/fatigue, no fever or chills   Skin: no rashes, lumps, or other skin changes   HEENT: no headache, +dizziness/lightheadedness, no vision changes, hearing changes, tinnitus, vertigo, sinus pressure/pain, bleeding gums, sore throat, or hoarseness   Neck: no swollen glands, goiter, pain or stiffness   Respiratory: no cough, sputum, hemoptysis, +dyspnea, no wheezing   Cardiovascular: no chest pain or discomfort, palpitations, +dyspnea, orthopnea, paroxysmal nocturnal dyspnea, +peripheral edema   Gastrointestinal: no trouble swallowing, heartburn, change of appetite, nausea, change in bowel habits, pain with defecation, rectal bleeding or black/tarry stools, hemorrhoids, constipation, diarrhea, abdominal pain, jaundice, liver or gallbladder problems   Urinary: no frequency, urgency , hematuria, burning/pain with urination, recent flank pain, polyuria, nocturia, or difficulty urinating   Peripheral Vascular: no claudication, leg cramps, prior DVTs, swelling of calves, legs, or feet, color change, or swelling with redness or tenderness   Musculoskeletal: no muscle or joint pain/stiffness, joint swelling, erythema of joints, or back pain   Psychiatric: no depression, mental disorders, or excessive stress   Neurological: no history of CVA, +dizziness, no sensory or motor loss, seizures, syncope, tremors, numbness, tingling, no changes in mood, attention, or speech, no changes in orientation, memory, insight, or judgment. no headache, vertigo. Hematologic: + anemia, easy bruising or bleeding   Endocrine: +diabetes, thyroid problems, heat or cold intolerance, excessive sweating, polyuria, polydipsia      Subjective:   Physical Exam    Visit Vitals    BP (!) 76/51 (BP 1 Location: Right arm, BP Patient Position: At rest)    Pulse 88    Temp 97 °F (36.1 °C)    Resp 20    Ht 5' 7\" (1.702 m)    Wt 102.2 kg (225 lb 3.2 oz)    SpO2 100%    BMI 35.27 kg/m2     General Appearance:  Well developed, well nourished, alert and oriented x 3, and individual in no acute distress. Ears/Nose/Mouth/Throat:   Hearing grossly normal.         Neck: Supple. Chest:   Lungs clear to auscultation bilaterally. Cardiovascular:  Regular rate and rhythm, S1, S2   Abdomen:   Soft, non-tender, bowel sounds are active. Extremities: 1+ pitting edema bilaterally. Skin: Warm and dry.            Cardiographics  Telemetry: AFIB  Echocardiogram: EF 25-30%    Labs: pending    Pt has been seen and examined by Dr. Dann Cha and he agrees with the following assessment and plan:     Assessment/Plan:       Diagnosis    Anemia- Hgb 7.9 at PCP with reports of melena, admit for transfusion, check stools for blood, GI consult, check iron studies, continue off Xarelto, ASA, follow CBC    Atrial fibrillation ([facility]- off Xarelto with GIB, continue Coreg as BP allows    CAD (coronary atherosclerosis) S/P CABG (coronary artery bypass graft)/ PCI- no angina, hold ASA, continue Coreg, no ACE-I/ARB with hypotension, statin    Dyslipidemia- statin    Chronic systolic heart failure (HCC) EF 25-30% s/p Biotronik ICD- Coreg, lasix, monitor    Diabetes ([address]- continue oral meds and Levemir, POC glucose and SSI qAC&HS       Jolene Castillo PA-C

## 2017-03-25 NOTE — PROGRESS NOTES
Bedside and Verbal shift change report given to [facility], RN (oncoming nurse) by Claudia Raymond RN (offgoing nurse). Report included the following information SBAR, Kardex, MAR and Recent Results.

## 2017-03-25 NOTE — PROGRESS NOTES
Bedside and Verbal shift change report given to [facility]. Report included the following information SBAR, Kardex, MAR, Accordion and Recent Results.

## 2017-03-25 NOTE — PROGRESS NOTES
Problem: Anemia Care Plan (Adult and Pediatric)  Goal: *Labs within defined limits  2 units of blood have been completed and current Hgb- 9.1

## 2017-03-25 NOTE — PROGRESS NOTES
Pt with approximately 60 second burst of atrial tachycardia. Pt resting in bed, denies complaints. VSS. Will monitor.

## 2017-03-25 NOTE — PROGRESS NOTES
3/25/2017 7:31 AM    Admit Date: 3/24/2017    Admit Diagnosis: Anemia;CHF; Anemia      Subjective:    Patient sp transfusion. Feeling better. Does not want egd. On IV protonix.  If stable thru Monday then home Monday or possible tomorrow    Objective:      Visit Vitals    BP (!) 88/55    Pulse 71    Temp 98 °F (36.7 °C)    Resp 16    Ht 5' 7\" (1.702 m)    Wt 101.2 kg (223 lb 3.2 oz)    SpO2 99%    BMI 34.96 kg/m2       ROS:  General ROS: negative for - chills  Hematological and Lymphatic ROS: negative for - blood clots or jaundice  Respiratory ROS: no cough, shortness of breath, or wheezing  Cardiovascular ROS: no chest pain or dyspnea on exertion  Gastrointestinal ROS: no abdominal pain, change in bowel habits, or black or bloody stools  Neurological ROS: no TIA or stroke symptoms    Physical Exam:    Physical Examination: General appearance - alert, well appearing, and in no distress  Mental status - alert, oriented to person, place, and time  Eyes - pupils equal and reactive, extraocular eye movements intact  Neck/lymph - supple, no significant adenopathy  Chest/CV - clear to auscultation, no wheezes, rales or rhonchi, symmetric air entry  Heart - normal rate, regular rhythm, normal S1, S2, no murmurs, rubs, clicks or gallops  Abdomen/GI - soft, nontender, nondistended, no masses or organomegaly  Musculoskeletal - no joint tenderness, deformity or swelling  Extremities - peripheral pulses normal, no pedal edema, no clubbing or cyanosis  Skin - normal coloration and turgor, no rashes, no suspicious skin lesions noted    Current Facility-Administered Medications   Medication Dose Route Frequency    allopurinol (ZYLOPRIM) tablet 300 mg  300 mg Oral 7am    atorvastatin (LIPITOR) tablet 40 mg  40 mg Oral QHS    citalopram (CELEXA) tablet 20 mg  20 mg Oral DAILY    glipiZIDE SR (GLUCOTROL XL) tablet 5 mg  5 mg Oral BID    insulin detemir (LEVEMIR) injection 41 Units  41 Units SubCUTAneous QHS    potassium chloride (K-DUR, KLOR-CON) tablet 10 mEq  10 mEq Oral DAILY    0.9% sodium chloride infusion 250 mL  250 mL IntraVENous PRN    sodium chloride (NS) flush 5-10 mL  5-10 mL IntraVENous Q8H    sodium chloride (NS) flush 5-10 mL  5-10 mL IntraVENous PRN    nitroglycerin (NITROSTAT) tablet 0.4 mg  0.4 mg SubLINGual Q5MIN PRN    morphine injection 2 mg  2 mg IntraVENous Q4H PRN    ondansetron (ZOFRAN) injection 4 mg  4 mg IntraVENous Q4H PRN    acetaminophen (TYLENOL) tablet 650 mg  650 mg Oral Q4H PRN    insulin lispro (HUMALOG) injection   SubCUTAneous AC&HS    pantoprazole (PROTONIX) 40 mg in sodium chloride 0.9 % 10 mL injection  40 mg IntraVENous Q12H       Data Review:   @LABRCNT(Na,K,BUN,CREA,WBC,HGB,HCT,PLT,INR,TRP,TCHOL*,Triglyceride*,LDL*,LDLCPOC HDL*,HDL])@    TELEMETRY: nsr    Assessment/Plan:     Active Problems:    Anemia (3/24/2017) sp transfusion. Feeling better  Conservative treatment    Systolic heart failure The current medical regimen is effective;  continue present plan and medications. Cad The current medical regimen is effective;  continue present plan and medications.             Yani Guzman MD

## 2017-03-25 NOTE — PROGRESS NOTES
Bedside report received from [facility]. Pt sitting in chair. No complaints. Will monitor.

## 2017-03-25 NOTE — PROGRESS NOTES
GI DAILY PROGRESS NOTE    Admit Date:  3/24/2017    Today's Date:  3/25/2017    CC:  anemia    Subjective:     Patient reports he feels well. He received 2U PRBC, finished about 0600. No bleeding. Medications:   Current Facility-Administered Medications   Medication Dose Route Frequency    citalopram (CELEXA) tablet 20 mg  20 mg Oral QHS    allopurinol (ZYLOPRIM) tablet 300 mg  300 mg Oral 7am    atorvastatin (LIPITOR) tablet 40 mg  40 mg Oral QHS    glipiZIDE SR (GLUCOTROL XL) tablet 5 mg  5 mg Oral BID    insulin detemir (LEVEMIR) injection 41 Units  41 Units SubCUTAneous QHS    potassium chloride (K-DUR, KLOR-CON) tablet 10 mEq  10 mEq Oral DAILY    0.9% sodium chloride infusion 250 mL  250 mL IntraVENous PRN    sodium chloride (NS) flush 5-10 mL  5-10 mL IntraVENous Q8H    sodium chloride (NS) flush 5-10 mL  5-10 mL IntraVENous PRN    nitroglycerin (NITROSTAT) tablet 0.4 mg  0.4 mg SubLINGual Q5MIN PRN    morphine injection 2 mg  2 mg IntraVENous Q4H PRN    ondansetron (ZOFRAN) injection 4 mg  4 mg IntraVENous Q4H PRN    acetaminophen (TYLENOL) tablet 650 mg  650 mg Oral Q4H PRN    insulin lispro (HUMALOG) injection   SubCUTAneous AC&HS    pantoprazole (PROTONIX) 40 mg in sodium chloride 0.9 % 10 mL injection  40 mg IntraVENous Q12H         Objective:   Vitals:  Visit Vitals    BP 97/58 (BP 1 Location: Right arm, BP Patient Position: At rest)    Pulse 70    Temp 97.4 °F (36.3 °C)    Resp 16    Ht 5' 7\" (1.702 m)    Wt 101.2 kg (223 lb 3.2 oz)    SpO2 97%    BMI 34.96 kg/m2     Intake/Output:  03/25 0701 - 03/25 1900  In: 300   Out: -   03/23 1901 - 03/25 0700  In: 1021.7 [P.O.:640]  Out: 800 [Urine:800]  Exam:  General appearance: alert, cooperative, no distress  Abdomen: soft, non-tender.  Bowel sounds normal. No masses, no organomegaly  Neuro:  alert and oriented    Data Review (Labs):    Recent Labs      03/24/17   1535   WBC  5.4   HGB  8.2*   HCT  25.5*   PLT  86*   MCV  87.3   NA 141   K  5.3*   CL  108*   CO2  22   BUN  40*   CREA  1.46   CA  8.4   MG  2.4   GLU  184*   AP  179*   SGOT  33   ALT  22   TBILI  0.6   ALB  3.7   TP  6.6       Assessment:     Active Problems:    Anemia (3/24/2017)-labs pending today. He declines EGD. I agree with Dr. Freedom Gross. Plan:     Conservative care. I will follow up tomorrow.     Annie Yeager MD

## 2017-03-26 NOTE — PROGRESS NOTES
Discharge instructions given and reviewed with patient, wife and son. All questions answered. IV and heart monitor removed. No other needs. Ready for discharge.

## 2017-03-26 NOTE — DISCHARGE SUMMARY
[address] Cardiology Discharge Summary     Patient ID:  Eva Dangelo.  [id number]  78 y.o.  1940    Admit date: 3/24/2017    Discharge date and time: 3/26/2017    Admitting Physician: Leo Link MD     Discharge Physician: ROBERT Gonsalez/Dr. Nolan Mendez    Admission Diagnoses: Anemia  CHF  Anemia    Discharge Diagnoses:    Diagnosis    Anemia    Atrial fibrillation ([facility] [address]    AICD (automatic cardioverter/defibrillator) present    Coronary atherosclerosis of native coronary vessel    Sleep apnea    Post PTCA/stent    S/P CABG (coronary artery bypass graft)    Dyslipidemia    Chronic systolic heart failure (HCC)    Coronary atherosclerosis of artery bypass graft    Chest pain    ARF (acute renal failure) (HCC)    HLD (hyperlipidemia)    Diabetes ([facility] [address]    Gout, chronic       Cardiology Procedures this admission:  None  Consults: GI    Hospital Course: Pt was admitted with complaints of melena x 3 weeks and xarelto was stopped 5 days prior to admission. He had a h/o a fib and sHF s/p ICD. He had an appt to see [facility] in a week but presented to his PCP with weakness and hgb was found to have dropped to 7.9. He was admitted to [address] [place] [address] for further evaluation and treatment. He was started on a PPI. Stool was heme negative. He was seen in consult by Dr Olman Ortiz from GI and declined EGD. He was found to be iron deficient. He was transfused 2 units PRB cells to a hgb of 9.5. On 3-26 he was felt to be stable, without CP or SOB, and ok to discharge on BID PPI and iron supplement. Will recheck hgb on Thursday. Will call pt with f/u appt with Dr Nolan Mendez, f/u with GI in 2-3 weeks. Coreg was stopped due to hypotension. DISPOSITION: The patient is being discharged home on a low saturated fat, low cholesterol diet. Pt is instructed to advance activities as tolerated.  Pt is instructed to call office or return to ER for immediate evaluation of any shortness of breath or chest pain not relieved by NTG. Discharge Exam:   Visit Vitals    BP (!) 83/51 (BP 1 Location: Right arm, BP Patient Position: At rest)    Pulse 70    Temp 96.7 °F (35.9 °C)    Resp 16    Ht 5' 7\" (1.702 m)    Wt 99.4 kg (219 lb 1.6 oz)    SpO2 99%    BMI 34.32 kg/m2     Patient seen and examined by Dr Ramón Nava- please see their note for further details.      Recent Results (from the past 24 hour(s))   METABOLIC PANEL, BASIC    Collection Time: 03/25/17 11:18 AM   Result Value Ref Range    Sodium 141 136 - 145 mmol/L    Potassium 4.7 3.5 - 5.1 mmol/L    Chloride 107 98 - 107 mmol/L    CO2 25 21 - 32 mmol/L    Anion gap 9 7 - 16 mmol/L    Glucose 163 (H) 65 - 100 mg/dL    BUN 38 (H) 8 - 23 MG/DL    Creatinine 1.35 0.8 - 1.5 MG/DL    GFR est AA >60 >60 ml/min/1.73m2    GFR est non-AA 55 (L) >60 ml/min/1.73m2    Calcium 8.4 8.3 - 10.4 MG/DL   LIPID PANEL    Collection Time: 03/25/17 11:18 AM   Result Value Ref Range    LIPID PROFILE          Cholesterol, total 74 <200 MG/DL    Triglyceride 162 (H) 35 - 150 MG/DL    HDL Cholesterol 19 (L) 40 - 60 MG/DL    LDL, calculated 22.6 <100 MG/DL    VLDL, calculated 32.4 (H) 6.0 - 23.0 MG/DL    CHOL/HDL Ratio 3.9     CBC W/O DIFF    Collection Time: 03/25/17 11:18 AM   Result Value Ref Range    WBC 4.9 4.3 - 11.1 K/uL    RBC 3.27 (L) 4.23 - 5.67 M/uL    HGB 9.1 (L) 13.6 - 17.2 g/dL    HCT 27.9 (L) 41.1 - 50.3 %    MCV 85.3 79.6 - 97.8 FL    MCH 27.8 26.1 - 32.9 PG    MCHC 32.6 31.4 - 35.0 g/dL    RDW 16.3 (H) 11.9 - 14.6 %    PLATELET 83 (L) 571 - 450 K/uL    MPV 10.3 (L) 10.8 - 14.1 FL   MAGNESIUM    Collection Time: 03/25/17 11:18 AM   Result Value Ref Range    Magnesium 2.1 1.8 - 2.4 mg/dL   GLUCOSE, POC    Collection Time: 03/25/17 11:36 AM   Result Value Ref Range    Glucose (POC) 191 (H) 65 - 100 mg/dL   GLUCOSE, POC    Collection Time: 03/25/17  5:14 PM   Result Value Ref Range    Glucose (POC) 169 (H) 65 - 100 mg/dL   GLUCOSE, POC    Collection Time: 03/25/17  9:47 PM   Result Value Ref Range    Glucose (POC) 181 (H) 65 - 353 mg/dL   METABOLIC PANEL, BASIC    Collection Time: 03/26/17  5:05 AM   Result Value Ref Range    Sodium 142 136 - 145 mmol/L    Potassium 4.2 3.5 - 5.1 mmol/L    Chloride 109 (H) 98 - 107 mmol/L    CO2 24 21 - 32 mmol/L    Anion gap 9 7 - 16 mmol/L    Glucose 83 65 - 100 mg/dL    BUN 31 (H) 8 - 23 MG/DL    Creatinine 1.23 0.8 - 1.5 MG/DL    GFR est AA >60 >60 ml/min/1.73m2    GFR est non-AA >60 >60 ml/min/1.73m2    Calcium 8.8 8.3 - 10.4 MG/DL   CBC W/O DIFF    Collection Time: 03/26/17  5:05 AM   Result Value Ref Range    WBC 6.4 4.3 - 11.1 K/uL    RBC 3.48 (L) 4.23 - 5.67 M/uL    HGB 9.5 (L) 13.6 - 17.2 g/dL    HCT 29.6 (L) 41.1 - 50.3 %    MCV 85.1 79.6 - 97.8 FL    MCH 27.3 26.1 - 32.9 PG    MCHC 32.1 31.4 - 35.0 g/dL    RDW 16.5 (H) 11.9 - 14.6 %    PLATELET 85 (L) 570 - 450 K/uL    MPV 10.0 (L) 10.8 - 14.1 FL   MAGNESIUM    Collection Time: 03/26/17  5:05 AM   Result Value Ref Range    Magnesium 2.2 1.8 - 2.4 mg/dL   GLUCOSE, POC    Collection Time: 03/26/17  6:19 AM   Result Value Ref Range    Glucose (POC) 96 65 - 100 mg/dL         Patient Instructions:   Current Discharge Medication List      START taking these medications    Details   aspirin 81 mg chewable tablet Take 1 Tab by mouth daily. ferrous sulfate (IRON, FERROUS SULFATE,) 325 mg (65 mg iron) tablet Take 1 Tab by mouth Daily (before breakfast). Qty: 90 Tab, Refills: 3         CONTINUE these medications which have CHANGED    Details   esomeprazole (NEXIUM) 40 mg capsule Take 1 Cap by mouth two (2) times a day. Qty: 180 Cap, Refills: 3         CONTINUE these medications which have NOT CHANGED    Details   colchicine 0.6 mg tablet Take 0.6 mg by mouth as needed. atorvastatin (LIPITOR) 40 mg tablet Take 40 mg by mouth daily. citalopram (CELEXA) 20 mg tablet Take 20 mg by mouth nightly.       insulin detemir (LEVEMIR FLEXPEN) 100 unit/mL (3 mL) pen 41 Units by SubCUTAneous route nightly. Patient states he takes 40 ml every night      glipiZIDE SR (GLUCOTROL XL) 5 mg CR tablet Take 1 Tab by mouth two (2) times a day. Qty: 30 Tab, Refills: 0      allopurinol (ZYLOPRIM) 300 mg tablet Take 300 mg by mouth every morning. potassium chloride (K-DUR, KLOR-CON) 10 mEq tablet Take 10 mEq by mouth daily. cpap machine kit by Does Not Apply route.  12 cm h2o         STOP taking these medications       carvedilol (COREG) 6.25 mg tablet Comments:   Reason for Stopping:                 Signed:  Clarence Mares PA-C  3/26/2017  8:30 AM

## 2017-03-26 NOTE — PROGRESS NOTES
GI DAILY PROGRESS NOTE    Admit Date:  3/24/2017    Today's Date:  3/26/2017    CC:  anemia    Subjective:     Patient feels well. No bleeding since admission. Asks for advice on chronic constipation. Medications:   Current Facility-Administered Medications   Medication Dose Route Frequency    [START ON 3/27/2017] aspirin chewable tablet 81 mg  81 mg Oral DAILY    citalopram (CELEXA) tablet 20 mg  20 mg Oral QHS    allopurinol (ZYLOPRIM) tablet 300 mg  300 mg Oral 7am    atorvastatin (LIPITOR) tablet 40 mg  40 mg Oral QHS    glipiZIDE SR (GLUCOTROL XL) tablet 5 mg  5 mg Oral BID    insulin detemir (LEVEMIR) injection 41 Units  41 Units SubCUTAneous QHS    potassium chloride (K-DUR, KLOR-CON) tablet 10 mEq  10 mEq Oral DAILY    0.9% sodium chloride infusion 250 mL  250 mL IntraVENous PRN    sodium chloride (NS) flush 5-10 mL  5-10 mL IntraVENous Q8H    sodium chloride (NS) flush 5-10 mL  5-10 mL IntraVENous PRN    nitroglycerin (NITROSTAT) tablet 0.4 mg  0.4 mg SubLINGual Q5MIN PRN    morphine injection 2 mg  2 mg IntraVENous Q4H PRN    ondansetron (ZOFRAN) injection 4 mg  4 mg IntraVENous Q4H PRN    acetaminophen (TYLENOL) tablet 650 mg  650 mg Oral Q4H PRN    insulin lispro (HUMALOG) injection   SubCUTAneous AC&HS    pantoprazole (PROTONIX) 40 mg in sodium chloride 0.9 % 10 mL injection  40 mg IntraVENous Q12H         Objective:   Vitals:  Visit Vitals    BP (!) 83/51 (BP 1 Location: Right arm, BP Patient Position: At rest)    Pulse 70    Temp 96.7 °F (35.9 °C)    Resp 16    Ht 5' 7\" (1.702 m)    Wt 99.4 kg (219 lb 1.6 oz)    SpO2 99%    BMI 34.32 kg/m2     Intake/Output:  03/26 0701 - 03/26 1900  In: 240 [P.O.:240]  Out: -   03/24 1901 - 03/26 0700  In: 2341.7 [P.O.:1660]  Out: 1950 [Urine:1950]  Exam:  General appearance: alert, cooperative, no distress  Abdomen: soft, non-tender.  Bowel sounds normal. No masses, no organomegaly  Neuro:  alert and oriented    Data Review (Labs): Recent Labs      03/26/17   0505  03/25/17   1118  03/24/17   1535   WBC  6.4  4.9  5.4   HGB  9.5*  9.1*  8.2*   HCT  29.6*  27.9*  25.5*   PLT  85*  83*  86*   MCV  85.1  85.3  87.3   NA  142  141  141   K  4.2  4.7  5.3*   CL  109*  107  108*   CO2  24  25  22   BUN  31*  38*  40*   CREA  1.23  1.35  1.46   CA  8.8  8.4  8.4   MG  2.2  2.1  2.4   GLU  83  163*  184*   AP   --    --   179*   SGOT   --    --   33   ALT   --    --   22   TBILI   --    --   0.6   ALB   --    --   3.7   TP   --    --   6.6       Assessment:     Active Problems:    HLD (hyperlipidemia) (5/23/2010)      Diabetes ([address] (5/23/2010)      Overview: W/o complicaiton      Chronic systolic heart failure (HCC) (12/12/2013)      Overview: EF, 35%, repeat echo is 25-30%      Atrial fibrillation (HCC) ()      Coronary atherosclerosis of native coronary vessel ()      Anemia (3/24/2017)-care coordinated with Dr. Emily Sawant. High risk for further bleeding with strong antiplatelet therapy. Dr. Emily Sawant plans asa therapy only. Plan:     I agree with discharge. I recommended Miralax daily and titrated to effectiveness for constipation. I will arrange outpatient follow up for his anemia. I have asked him not to wait 3 weeks if he has black stool but rather to call my office immediately in the hope that hospitalization can be avoided.       eMd Cox MD

## 2017-03-26 NOTE — PROGRESS NOTES
3/26/2017 7:31 AM    Admit Date: 3/24/2017    Admit Diagnosis: Anemia;CHF; Anemia      Subjective:    Patient sp transfusion. Feeling better. Does not want egd. On IV protonix. If stable thru [facility].  May give one more unit    Objective:      Visit Vitals    BP 93/54 (BP 1 Location: Right arm, BP Patient Position: At rest)    Pulse 93    Temp 98 °F (36.7 °C)    Resp 18    Ht 5' 7\" (1.702 m)    Wt 99.4 kg (219 lb 1.6 oz)    SpO2 96%    BMI 34.32 kg/m2       ROS:  General ROS: negative for - chills  Hematological and Lymphatic ROS: negative for - blood clots or jaundice  Respiratory ROS: no cough, shortness of breath, or wheezing  Cardiovascular ROS: no chest pain or dyspnea on exertion  Gastrointestinal ROS: no abdominal pain, change in bowel habits, or black or bloody stools  Neurological ROS: no TIA or stroke symptoms    Physical Exam:    Physical Examination: General appearance - alert, well appearing, and in no distress  Mental status - alert, oriented to person, place, and time  Eyes - pupils equal and reactive, extraocular eye movements intact  Neck/lymph - supple, no significant adenopathy  Chest/CV - clear to auscultation, no wheezes, rales or rhonchi, symmetric air entry  Heart - normal rate, regular rhythm, normal S1, S2, no murmurs, rubs, clicks or gallops  Abdomen/GI - soft, nontender, nondistended, no masses or organomegaly  Musculoskeletal - no joint tenderness, deformity or swelling  Extremities - peripheral pulses normal, no pedal edema, no clubbing or cyanosis  Skin - normal coloration and turgor, no rashes, no suspicious skin lesions noted    Current Facility-Administered Medications   Medication Dose Route Frequency    citalopram (CELEXA) tablet 20 mg  20 mg Oral QHS    allopurinol (ZYLOPRIM) tablet 300 mg  300 mg Oral 7am    atorvastatin (LIPITOR) tablet 40 mg  40 mg Oral QHS    glipiZIDE SR (GLUCOTROL XL) tablet 5 mg  5 mg Oral BID    insulin detemir (LEVEMIR) injection 41 Units  41 Units SubCUTAneous QHS    potassium chloride (K-DUR, KLOR-CON) tablet 10 mEq  10 mEq Oral DAILY    0.9% sodium chloride infusion 250 mL  250 mL IntraVENous PRN    sodium chloride (NS) flush 5-10 mL  5-10 mL IntraVENous Q8H    sodium chloride (NS) flush 5-10 mL  5-10 mL IntraVENous PRN    nitroglycerin (NITROSTAT) tablet 0.4 mg  0.4 mg SubLINGual Q5MIN PRN    morphine injection 2 mg  2 mg IntraVENous Q4H PRN    ondansetron (ZOFRAN) injection 4 mg  4 mg IntraVENous Q4H PRN    acetaminophen (TYLENOL) tablet 650 mg  650 mg Oral Q4H PRN    insulin lispro (HUMALOG) injection   SubCUTAneous AC&HS    pantoprazole (PROTONIX) 40 mg in sodium chloride 0.9 % 10 mL injection  40 mg IntraVENous Q12H       Data Review:   @LABRCNT(Na,K,BUN,CREA,WBC,HGB,HCT,PLT,INR,TRP,TCHOL*,Triglyceride*,LDL*,LDLCPOC HDL*,HDL])@    TELEMETRY: nsr    Assessment/Plan:     Active Problems:    Anemia (3/24/2017) sp transfusion. Feeling better  Conservative treatment    Systolic heart failure The current medical regimen is effective;  continue present plan and medications. Cad The current medical regimen is effective;  continue present plan and medications.             Shannan Luna MD

## 2017-03-26 NOTE — PROGRESS NOTES
Bedside and Verbal shift change report given to  [facility]. Report included the following information SBAR, Kardex, MAR, Accordion and Recent Results.

## 2017-03-26 NOTE — DISCHARGE INSTRUCTIONS
Anemia: Care Instructions  Your Care Instructions    Anemia is a low level of red blood cells, which carry oxygen throughout your body. Many things can cause anemia. Lack of iron is one of the most common causes. Your body needs iron to make hemoglobin, a substance in red blood cells that carries oxygen from the lungs to your body's cells. Without enough iron, the body produces fewer and smaller red blood cells. As a result, your body's cells do not get enough oxygen, and you feel tired and weak. And you may have trouble concentrating. Bleeding is the most common cause of a lack of iron. You may have heavy menstrual bleeding or bleeding caused by conditions such as ulcers, hemorrhoids, or cancer. Regular use of aspirin or other anti-inflammatory medicines (such as ibuprofen) also can cause bleeding in some people. A lack of iron in your diet also can cause anemia, especially at times when the body needs more iron, such as during pregnancy, infancy, and the teen years. Your doctor may have prescribed iron pills. It may take several months of treatment for your iron levels to return to normal. Your doctor also may suggest that you eat foods that are rich in iron, such as meat and beans. There are many other causes of anemia. It is not always due to a lack of iron. Finding the specific cause of your anemia will help your doctor find the right treatment for you. Follow-up care is a key part of your treatment and safety. Be sure to make and go to all appointments, and call your doctor if you are having problems. It's also a good idea to know your test results and keep a list of the medicines you take. How can you care for yourself at home? · Take your medicines exactly as prescribed. Call your doctor if you think you are having a problem with your medicine. · If your doctor recommends iron pills, take them as directed:  ¨ Try to take the pills on an empty stomach about 1 hour before or 2 hours after meals. But you may need to take iron with food to avoid an upset stomach. ¨ Do not take antacids or drink milk or caffeine drinks (such as coffee, tea, or cola) at the same time or within 2 hours of the time that you take your iron. They can make it hard for your body to absorb the iron. ¨ Vitamin C (from food or supplements) helps your body absorb iron. Try taking iron pills with a glass of orange juice or some other food that is high in vitamin C, such as citrus fruits. ¨ Iron pills may cause stomach problems, such as heartburn, nausea, diarrhea, constipation, and cramps. Be sure to drink plenty of fluids, and include fruits, vegetables, and fiber in your diet each day. Iron pills often make your bowel movements dark or green. ¨ If you forget to take an iron pill, do not take a double dose of iron the next time you take a pill. ¨ Keep iron pills out of the reach of small children. An overdose of iron can be very dangerous. · Follow your doctor's advice about eating iron-rich foods. These include red meat, shellfish, poultry, eggs, beans, raisins, whole-grain bread, and leafy green vegetables. · Steam vegetables to help them keep their iron content. When should you call for help? Call 911 anytime you think you may need emergency care. For example, call if:  · You have symptoms of a heart attack. These may include:  ¨ Chest pain or pressure, or a strange feeling in the chest.  ¨ Sweating. ¨ Shortness of breath. ¨ Nausea or vomiting. ¨ Pain, pressure, or a strange feeling in the back, neck, jaw, or upper belly or in one or both shoulders or arms. ¨ Lightheadedness or sudden weakness. ¨ A fast or irregular heartbeat. After you call 911, the  may tell you to chew 1 adult-strength or 2 to 4 low-dose aspirin. Wait for an ambulance. Do not try to drive yourself. · You passed out (lost consciousness).   Call your doctor now or seek immediate medical care if:  · You have new or increased shortness of breath. · You are dizzy or lightheaded, or you feel like you may faint. · Your fatigue and weakness continue or get worse. · You have any abnormal bleeding, such as:  ¨ Nosebleeds. ¨ Vaginal bleeding that is different (heavier, more frequent, at a different time of the month) than what you are used to. ¨ Bloody or black stools, or rectal bleeding. ¨ Bloody or pink urine. Watch closely for changes in your health, and be sure to contact your doctor if:  · You do not get better as expected. Where can you learn more? Go to http://paulo-saeed.info/. Enter R301 in the search box to learn more about \"Anemia: Care Instructions. \"  Current as of: February 5, 2016  Content Version: 11.1  © 7215-7298 [employer]. Care instructions adapted under license by [employer] (which disclaims liability or warranty for this information). If you have questions about a medical condition or this instruction, always ask your healthcare professional. [place] [address] any warranty or liability for your use of this information. Heart Failure: Care Instructions  Your Care Instructions    Heart failure occurs when your heart does not pump as much blood as the body needs. Failure does not mean that the heart has stopped pumping but rather that it is not pumping as well as it should. Over time, this causes fluid buildup in your lungs and other parts of your body. Fluid buildup can cause shortness of breath, fatigue, swollen ankles, and other problems. By taking medicines regularly, reducing sodium (salt) in your diet, checking your weight every day, and making lifestyle changes, you can feel better and live longer. Follow-up care is a key part of your treatment and safety. Be sure to make and go to all appointments, and call your doctor if you are having problems. It's also a good idea to know your test results and keep a list of the medicines you take.   How can you care for yourself at home? Medicines  · Be safe with medicines. Take your medicines exactly as prescribed. Call your doctor if you think you are having a problem with your medicine. · Do not take any vitamins, over-the-counter medicine, or herbal products without talking to your doctor first. Leopoldo Moloney not take ibuprofen (Advil or Motrin) and naproxen (Aleve) without talking to your doctor first. They could make your heart failure worse. · You may be taking some of the following medicine. ¨ Beta-blockers can slow heart rate, decrease blood pressure, and improve your condition. Taking a beta-blocker may lower your chance of needing to be hospitalized. ¨ Angiotensin-converting enzyme inhibitors (ACEIs) reduce the heart's workload, lower blood pressure, and reduce swelling. Taking an ACEI may lower your chance of needing to be hospitalized again. ¨ Angiotensin II receptor blockers (ARBs) work like ACEIs. Your doctor may prescribe them instead of ACEIs. ¨ Diuretics, also called water pills, reduce swelling. ¨ Potassium supplements replace this important mineral, which is sometimes lost with diuretics. ¨ Aspirin and other blood thinners prevent blood clots, which can cause a stroke or heart attack. You will get more details on the specific medicines your doctor prescribes. Diet  · Your doctor may suggest that you limit sodium to 2,000 milligrams (mg) a day or less. That is less than 1 teaspoon of salt a day, including all the salt you eat in cooking or in packaged foods. People get most of their sodium from processed foods. Fast food and restaurant meals also tend to be very high in sodium. · Ask your doctor how much liquid you can drink each day. You may have to limit liquids. Weight  · Weigh yourself without clothing at the same time each day. Record your weight. Call your doctor if you gain more than 3 pounds in 2 to 3 days. A sudden weight gain may mean that your heart failure is getting worse.   Activity level  · Start light exercise (if your doctor says it is okay). Even if you can only do a small amount, exercise will help you get stronger, have more energy, and manage your weight and your stress. Walking is an easy way to get exercise. Start out by walking a little more than you did before. Bit by bit, increase the amount you walk. · When you exercise, watch for signs that your heart is working too hard. You are pushing yourself too hard if you cannot talk while you are exercising. If you become short of breath or dizzy or have chest pain, stop, sit down, and rest.  · If you feel \"wiped out\" the day after you exercise, walk slower or for a shorter distance until you can work up to a better pace. · Get enough rest at night. Sleeping with 1 or 2 pillows under your upper body and head may help you breathe easier. Lifestyle changes  · Do not smoke. Smoking can make a heart condition worse. If you need help quitting, talk to your doctor about stop-smoking programs and medicines. These can increase your chances of quitting for good. Quitting smoking may be the most important step you can take to protect your heart. · Limit alcohol to 2 drinks a day for men and 1 drink a day for women. Too much alcohol can cause health problems. · Avoid getting sick from colds and the flu. Get a pneumococcal vaccine shot. If you have had one before, ask your doctor whether you need another dose. Get a flu shot each year. If you must be around people with colds or the flu, wash your hands often. When should you call for help? Call 911 if you have symptoms of sudden heart failure such as:  · You have severe trouble breathing. · You cough up pink, foamy mucus. · You have a new irregular or rapid heartbeat. Call your doctor now or seek immediate medical care if:  · You have new or increased shortness of breath. · You are dizzy or lightheaded, or you feel like you may faint.   · You have sudden weight gain, such as 3 pounds or more in 2 to 3 days. · You have increased swelling in your legs, ankles, or feet. · You are suddenly so tired or weak that you cannot do your usual activities. Watch closely for changes in your health, and be sure to contact your doctor if:  · You develop new symptoms. Where can you learn more? Go to http://paulo-saeed.info/. Enter M293 in the search box to learn more about \"Heart Failure: Care Instructions. \"  Current as of: January 27, 2016  Content Version: 11.1  © 4132-0776 [employer]. Care instructions adapted under license by [employer] (which disclaims liability or warranty for this information). If you have questions about a medical condition or this instruction, always ask your healthcare professional. [address] any warranty or liability for your use of this information. DISCHARGE SUMMARY from Nurse    The following personal items are in your possession at time of discharge:       Visual Aid: None        Jewelry: Ring, With patient  Clothing: Pants, Shirt, Undergarments, With patient                PATIENT INSTRUCTIONS:    After general anesthesia or intravenous sedation, for 24 hours or while taking prescription Narcotics:  · Limit your activities  · Do not drive and operate hazardous machinery  · Do not make important personal or business decisions  · Do  not drink alcoholic beverages  · If you have not urinated within 8 hours after discharge, please contact your surgeon on call.     Report the following to your surgeon:  · Excessive pain, swelling, redness or odor of or around the surgical area  · Temperature over 100.5  · Nausea and vomiting lasting longer than 4 hours or if unable to take medications  · Any signs of decreased circulation or nerve impairment to extremity: change in color, persistent  numbness, tingling, coldness or increase pain  · Any questions          *  Please give a list of your current medications to your Primary Care Provider. *  Please update this list whenever your medications are discontinued, doses are      changed, or new medications (including over-the-counter products) are added. *  Please carry medication information at all times in case of emergency situations. These are general instructions for a healthy lifestyle:    No smoking/ No tobacco products/ Avoid exposure to second hand smoke    Surgeon General's Warning:  Quitting smoking now greatly reduces serious risk to your health. Obesity, smoking, and sedentary lifestyle greatly increases your risk for illness    A healthy diet, regular physical exercise & weight monitoring are important for maintaining a healthy lifestyle    You may be retaining fluid if you have a history of heart failure or if you experience any of the following symptoms:  Weight gain of 3 pounds or more overnight or 5 pounds in a week, increased swelling in our hands or feet or shortness of breath while lying flat in bed. Please call your doctor as soon as you notice any of these symptoms; do not wait until your next office visit. Recognize signs and symptoms of STROKE:    F-face looks uneven    A-arms unable to move or move unevenly    S-speech slurred or non-existent    T-time-call 911 as soon as signs and symptoms begin-DO NOT go       Back to bed or wait to see if you get better-TIME IS BRAIN. Warning Signs of HEART ATTACK     Call 911 if you have these symptoms:   Chest discomfort. Most heart attacks involve discomfort in the center of the chest that lasts more than a few minutes, or that goes away and comes back. It can feel like uncomfortable pressure, squeezing, fullness, or pain.  Discomfort in other areas of the upper body. Symptoms can include pain or discomfort in one or both arms, the back, neck, jaw, or stomach.  Shortness of breath with or without chest discomfort.    Other signs may include breaking out in a cold sweat, nausea, or lightheadedness. Don't wait more than five minutes to call 911 - MINUTES MATTER! Fast action can save your life. Calling 911 is almost always the fastest way to get lifesaving treatment. Emergency Medical Services staff can begin treatment when they arrive -- up to an hour sooner than if someone gets to the hospital by car. The discharge information has been reviewed with the patient. The patient verbalized understanding. Discharge medications reviewed with the patient and appropriate educational materials and side effects teaching were provided.

## 2017-09-07 PROBLEM — L97.509 DIABETIC FOOT ULCER (HCC): Status: ACTIVE | Noted: 2017-01-01

## 2017-09-07 PROBLEM — E11.621 DIABETIC FOOT ULCER (HCC): Status: ACTIVE | Noted: 2017-01-01

## 2017-09-07 PROBLEM — I47.20 VT (VENTRICULAR TACHYCARDIA): Status: ACTIVE | Noted: 2017-01-01

## 2017-09-07 NOTE — IP AVS SNAPSHOT
[address] 
[phone] Patient: Kang Byrne Sr. MRN: [id number] NICK:8[birth year]07 You are allergic to the following Allergen Reactions Flagyl (Metronidazole) Rash Recent Documentation Weight BMI Smoking Status  
  
  
  
 100.7 kg 34.75 kg/m2 Former Smoker Emergency Contacts Name Discharge Info Relation Home Work Mobile Randi Burt  Spouse [3] [phone] Anastacio Burt  Son [22] [phone] About your hospitalization You were admitted on:  September 7, 2017 You last received care in the:  [facility] You were discharged on:  September 9, 2017 Unit phone number:  [phone] Why you were hospitalized Your primary diagnosis was:  Vt (Ventricular Tachycardia) (Hcc) Your diagnoses also included:  S/P Cabg (Coronary Artery Bypass Graft), Post Ptca, Hld (Hyperlipidemia), Dyslipidemia, Gout, Chronic, Diabetes (Hcc), Chronic Systolic Heart Failure (Hcc), Atrial Fibrillation (Hcc), Anemia, Aicd (Automatic Cardioverter/Defibrillator) Present, Diabetic Foot Ulcer (Hcc), Ventricular Tachycardia (Hcc) Providers Seen During Your Hospitalizations Provider Role Specialty Primary office phone Kelton Moreno MD Attending Provider Cardiology [phone] Your Primary Care Physician (PCP) Primary Care Physician Office Phone Office Fax Eliezer Fraga [phone] Follow-up Information Follow up With Details Comments Contact Info Aysha Gonzalez MD  Tues Sept 26 at 3235 [facility] office [address] [place] [id number] 
[phone] Arturo Shields MD   [address] [place] [address] 
[phone] Your Appointments  Monday September 25, 2017  9:10 AM EDT  
REMOTE DEVICE CHECK ORDERS ONLY ENCOUNTER with LEEANNA REMOTE AICD 62  
 [facility] CARDIOLOGY [place] OFFICE ([address] [place] [address]) [address] [place]  
[address]  
[phone] Please remember THIS REMOTE CHECK IS COMPLETED FROM HOME - YOU WILL NOT COME TO THE OFFICE FOR THIS APPOINTMENT. In preparation for this check, please ensure your monitor box is working appropriately. If your monitor requires you to send a transmission, please make sure it is sent by 11:00AM on this day so we can have it processed and resulted to your doctor without delay. If you have a question or problem with the monitor box, please contact your respective company:   [address] [place] [address]/[employer] - [phone]  [employer] - [phone]  [employer] - [phone]  [employer]/[facility] - [phone]  If you have any further questions or need to move this appointment, we are happy to help and can be reached at [phone]. Tuesday September 26, 2017 10:45 AM EDT HOSPITAL FOLLOW-UP with Jeannie Aguilar MD  
[facility] OFFICE ([address] [place] [address]) [address] [place]  
[address]  
[phone] Thursday October 12, 2017  2:15 PM EDT Office Visit with Yuli Mancuso MD  
[facility] CARDIOLOGY [facility] OFFICE ([address] [place] [address]) [address] [place] [address]  
[phone] Current Discharge Medication List  
  
START taking these medications Dose & Instructions Dispensing Information Comments Morning Noon Evening Bedtime  
 amiodarone 200 mg tablet Commonly known as:  CORDARONE Dose:  200 mg Take 1 Tab by mouth two (2) times a day. Quantity:  60 Tab Refills:  6  
     
  
   
   
  
   
  
 carvedilol 3.125 mg tablet Commonly known as:  Tyson Hirschfeld Dose:  3.125 mg Take 1 Tab by mouth two (2) times daily (with meals). Quantity:  60 Tab Refills:  6 CONTINUE these medications which have CHANGED Dose & Instructions Dispensing Information Comments Morning Noon Evening Bedtime  
 esomeprazole 40 mg capsule Commonly known as:  Julius Cool What changed:  when to take this Dose:  40 mg Take 1 Cap by mouth two (2) times a day. Quantity:  180 Cap Refills:  3 CONTINUE these medications which have NOT CHANGED Dose & Instructions Dispensing Information Comments Morning Noon Evening Bedtime  
 allopurinol 300 mg tablet Commonly known as:  Magalene Oyster Dose:  300 mg Take 300 mg by mouth every morning. Refills:  0  
     
  
   
   
   
  
 amoxicillin 500 mg Tab Dose:  500 mg Take 500 mg by mouth two (2) times a day. Refills:  0  
     
  
   
   
  
   
  
 aspirin 81 mg chewable tablet Dose:  81 mg Take 1 Tab by mouth daily. Refills:  0  
     
  
   
   
   
  
 atorvastatin 40 mg tablet Commonly known as:  LIPITOR Dose:  40 mg Take 40 mg by mouth daily. Refills:  0  
     
  
   
   
   
  
 citalopram 20 mg tablet Commonly known as:  Savannah Sink Dose:  20 mg Take 20 mg by mouth nightly. Refills:  0  
     
   
   
   
  
  
 colchicine 0.6 mg tablet Dose:  0.6 mg Take 0.6 mg by mouth as needed. Refills:  0  
     
   
   
   
  
 cpap machine kit  
   
 by Does Not Apply route. 12 cm h2o Refills:  0  
     
   
   
   
  
 ferrous sulfate 325 mg (65 mg iron) tablet Commonly known as:  Iron (Ferrous Sulfate) Dose:  325 mg Take 1 Tab by mouth Daily (before breakfast). Quantity:  90 Tab Refills:  3  
     
  
   
   
   
  
 glipiZIDE SR 5 mg CR tablet Commonly known as:  GLUCOTROL XL Dose:  5 mg Take 1 Tab by mouth two (2) times a day. Quantity:  30 Tab Refills:  0  
     
  
   
   
  
   
  
 LASIX 40 mg tablet Generic drug:  furosemide  Dose:  40 mg  
 Take 40 mg by mouth three (3) times daily. Refills:  0 LEVEMIR FLEXPEN 100 unit/mL (3 mL) Inpn Generic drug:  insulin detemir Dose:  30 Units 30 Units by SubCUTAneous route nightly. Patient states he takes 40 ml every night Refills:  0  
     
   
   
   
  
  
 potassium chloride 10 mEq tablet Commonly known as:  KLOR-CON Dose:  10 mEq Take 10 mEq by mouth two (2) times a day. Refills:  0 Where to Get Your Medications Information on where to get these meds will be given to you by the nurse or doctor. ! Ask your nurse or doctor about these medications  
  amiodarone 200 mg tablet  
 carvedilol 3.125 mg tablet Discharge Instructions Learning About ICDs (Implantable Cardioverter-Defibrillators) What is an ICD (implantable cardioverter-defibrillator)? An ICD (implantable cardioverter-defibrillator) is a small, battery-powered device. It fixes serious changes in your heartbeat. ICDs are used in people who have had a life-threatening heart rhythm or are at high risk of having one. The ICD is placed inside the chest. It's attached to one or two wires (called leads) that go into the heart through a vein. How does an ICD work? An ICD is always checking your heart rate and rhythm. If the ICD detects a life-threatening, rapid heart rhythm, it tries to slow the rhythm to get it back to normal. If the bad rhythm doesn't stop, the ICD sends an electric shock to the heart. This restores a normal rhythm. The device then goes back to its watchful mode. An ICD also can fix a heart rate that is too fast or too slow. It does this without using a shock. It can send out electrical pulses to speed up a heart rate that is too slow.  Or it can slow down a fast heart rate by matching the pace and bringing the heart rate back to normal. 
 Your doctor will check the ICD regularly to make sure it is working right and isn't causing any problems. Your doctor will also check the battery to see if it needs to be replaced. How is an ICD placed? Your doctor will put the ICD under the skin in your chest during minor surgery. You will likely have medicine to make you feel relaxed and sleepy during the surgery. Your doctor makes a small cut (incision) in your upper chest. He or she puts one or two leads (wires) in a vein and threads them to the heart. Your doctor connects the leads to the ICD and places it in your chest. Then the incision is closed. Your doctor also programs the ICD. Most people spend the night in the hospital, just to make sure that the device is working and that there are no problems from the surgery. How does it feel to get a shock? The shock from an ICD hurts briefly. People feel it in different ways. It's been described as feeling like a punch in the chest. But the shock is a sign that the ICD is doing its job. It's there to save your life. You won't feel any pain if the ICD uses electrical pulses to fix a heart rate that is too fast or too slow. There's no way to know how often a shock might occur. It might never happen. Not knowing when or if a shock might happen may make you nervous. Knowing what to do if you get shocked can help. Ask your doctor for an action plan. This plan will guide you step-by-step if a shock happens. What else should you know? You can live a normal life with your ICD. Here are a few tips for living well with your ICD. · Avoid strong magnetic and electrical fields. These can keep your device from working right. Most office equipment and home appliances are safe to use. Check with your doctor about which things you should use with caution and which you should stay away from. · Be sure that any doctor, dentist, or other health professional you see knows that you have an ICD. · Always carry a card that tells what kind of device you have. Wear medical alert jewelry that says you have an ICD. You can buy this at most drugstores. · If you do get a shock, follow your action plan for what to do. · You can lead an active life with an ICD. Ask your doctor what sort of activity and intensity is safe for you. As you plan for your future and your end of life, be sure to include plans for your ICD. You can make the decision to turn off your ICD as part of the medical treatment you want at the end of life. Follow-up care is a key part of your treatment and safety. Be sure to make and go to all appointments, and call your doctor if you are having problems. It's also a good idea to know your test results and keep a list of the medicines you take. Where can you learn more? Go to http://paulo-saeed.info/. Enter A553 in the search box to learn more about \"Learning About ICDs (Implantable Cardioverter-Defibrillators). \" 
Current as of: September 29, 2016 Content Version: 11.3 © 3649-0695 [employer]. Care instructions adapted under license by [employer] (which disclaims liability or warranty for this information). If you have questions about a medical condition or this instruction, always ask your healthcare professional. [address] any warranty or liability for your use of this information. Avoiding Triggers With Heart Failure: Care Instructions Your Care Instructions Triggers are anything that make your heart failure flare up. A flare-up is also called \"sudden heart failure\" or \"acute heart failure. \" When you have a flare-up, fluid builds up in your lungs, and you have problems breathing. You might need to go to the hospital. By watching for changes in your condition and avoiding triggers, you can prevent heart failure flare-ups. Follow-up care is a key part of your treatment and safety.  Be sure to make and go to all appointments, and call your doctor if you are having problems. It's also a good idea to know your test results and keep a list of the medicines you take. How can you care for yourself at home? Watch for changes in your weight and condition · Weigh yourself without clothing at the same time each day. Record your weight. Call your doctor if you have sudden weight gain, such as more than 2 to 3 pounds in a day or 5 pounds in a week. (Your doctor may suggest a different range of weight gain.) A sudden weight gain may mean that your heart failure is getting worse. · Keep a daily record of your symptoms. Write down any changes in how you feel, such as new shortness of breath, cough, or problems eating. Also record if your ankles are more swollen than usual and if you feel more tired than usual. Note anything that you ate or did that could have triggered these changes. Limit sodium Sodium causes your body to hold on to extra water. This may cause your heart failure symptoms to get worse. People get most of their sodium from processed foods. Fast food and restaurant meals also tend to be very high in sodium. · Your doctor may suggest that you limit sodium to 2,000 milligrams (mg) a day or less. That is less than 1 teaspoon of salt a day, including all the salt you eat in cooking or in packaged foods. · Read food labels on cans and food packages. They tell you how much sodium you get in one serving. Check the serving size. If you eat more than one serving, you are getting more sodium. · Be aware that sodium can come in forms other than salt, including monosodium glutamate (MSG), sodium citrate, and sodium bicarbonate (baking soda). MSG is often added to Asian food. You can sometimes ask for food without MSG or salt. · Slowly reducing salt will help you adjust to the taste. Take the salt shaker off the table.  
· Flavor your food with garlic, lemon juice, onion, vinegar, herbs, and spices instead of salt. Do not use soy sauce, steak sauce, onion salt, garlic salt, mustard, or ketchup on your food, unless it is labeled \"low-sodium\" or \"low-salt. \" 
· Make your own salad dressings, sauces, and ketchup without adding salt. · Use fresh or frozen ingredients, instead of canned ones, whenever you can. Choose low-sodium canned goods. · Eat less processed food and food from restaurants, including fast food. Exercise as directed Moderate, regular exercise is very good for your heart. It improves your blood flow and helps control your weight. But too much exercise can stress your heart and cause a heart failure flare-up. · Check with your doctor before you start an exercise program. 
· Walking is an easy way to get exercise. Start out slowly. Gradually increase the length and pace of your walk. Swimming, riding a bike, and using a treadmill are also good forms of exercise. · When you exercise, watch for signs that your heart is working too hard. You are pushing yourself too hard if you cannot talk while you are exercising. If you become short of breath or dizzy or have chest pain, stop, sit down, and rest. 
· Do not exercise when you do not feel well. Take medicines correctly · Take your medicines exactly as prescribed. Call your doctor if you think you are having a problem with your medicine. · Make a list of all the medicines you take. Include those prescribed to you by other doctors and any over-the-counter medicines, vitamins, or supplements you take. Take this list with you when you go to any doctor. · Take your medicines at the same time every day. It may help you to post a list of all the medicines you take every day and what time of day you take them. · Make taking your medicine as simple as you can. Plan times to take your medicines when you are doing other things, such as eating a meal or getting ready for bed. This will make it easier to remember to take your medicines. · Get organized. Use helpful tools, such as daily or weekly pill containers. When should you call for help? Call 911 if you have symptoms of sudden heart failure such as: 
· You have severe trouble breathing. · You cough up pink, foamy mucus. · You have a new irregular or rapid heartbeat. Call your doctor now or seek immediate medical care if: 
· You have new or increased shortness of breath. · You are dizzy or lightheaded, or you feel like you may faint. · You have sudden weight gain, such as more than 2 to 3 pounds in a day or 5 pounds in a week. (Your doctor may suggest a different range of weight gain.) · You have increased swelling in your legs, ankles, or feet. · You are suddenly so tired or weak that you cannot do your usual activities. Watch closely for changes in your health, and be sure to contact your doctor if you develop new symptoms. Where can you learn more? Go to http://paulo-saeed.info/. Enter X775 in the search box to learn more about \"Avoiding Triggers With Heart Failure: Care Instructions. \" Current as of: February 23, 2017 Content Version: 11.3 © 3037-0729 [employer]. Care instructions adapted under license by [employer] (which disclaims liability or warranty for this information). If you have questions about a medical condition or this instruction, always ask your healthcare professional. [place] [address] any warranty or liability for your use of this information. Heart Failure: Care Instructions Your Care Instructions Heart failure occurs when your heart does not pump as much blood as the body needs. Failure does not mean that the heart has stopped pumping but rather that it is not pumping as well as it should. Over time, this causes fluid buildup in your lungs and other parts of your body.  Fluid buildup can cause shortness of breath, fatigue, swollen ankles, and other problems. By taking medicines regularly, reducing sodium (salt) in your diet, checking your weight every day, and making lifestyle changes, you can feel better and live longer. Follow-up care is a key part of your treatment and safety. Be sure to make and go to all appointments, and call your doctor if you are having problems. It's also a good idea to know your test results and keep a list of the medicines you take. How can you care for yourself at home? Medicines · Be safe with medicines. Take your medicines exactly as prescribed. Call your doctor if you think you are having a problem with your medicine. · Do not take any vitamins, over-the-counter medicine, or herbal products without talking to your doctor first. Jenn Mckeon not take ibuprofen (Advil or Motrin) and naproxen (Aleve) without talking to your doctor first. They could make your heart failure worse. · You may be taking some of the following medicine. ¨ Beta-blockers can slow heart rate, decrease blood pressure, and improve your condition. Taking a beta-blocker may lower your chance of needing to be hospitalized. ¨ Angiotensin-converting enzyme inhibitors (ACEIs) reduce the heart's workload, lower blood pressure, and reduce swelling. Taking an ACEI may lower your chance of needing to be hospitalized again. ¨ Angiotensin II receptor blockers (ARBs) work like ACEIs. Your doctor may prescribe them instead of ACEIs. ¨ Diuretics, also called water pills, reduce swelling. ¨ Potassium supplements replace this important mineral, which is sometimes lost with diuretics. ¨ Aspirin and other blood thinners prevent blood clots, which can cause a stroke or heart attack. You will get more details on the specific medicines your doctor prescribes. Diet · Your doctor may suggest that you limit sodium to 2,000 milligrams (mg) a day or less.  That is less than 1 teaspoon of salt a day, including all the salt you eat in cooking or in packaged foods. People get most of their sodium from processed foods. Fast food and restaurant meals also tend to be very high in sodium. · Ask your doctor how much liquid you can drink each day. You may have to limit liquids. Weight · Weigh yourself without clothing at the same time each day. Record your weight. Call your doctor if you have a sudden weight gain, such as more than 2 to 3 pounds in a day or 5 pounds in a week. (Your doctor may suggest a different range of weight gain.) A sudden weight gain may mean that your heart failure is getting worse. Activity level · Start light exercise (if your doctor says it is okay). Even if you can only do a small amount, exercise will help you get stronger, have more energy, and manage your weight and your stress. Walking is an easy way to get exercise. Start out by walking a little more than you did before. Bit by bit, increase the amount you walk. · When you exercise, watch for signs that your heart is working too hard. You are pushing yourself too hard if you cannot talk while you are exercising. If you become short of breath or dizzy or have chest pain, stop, sit down, and rest. 
· If you feel \"wiped out\" the day after you exercise, walk slower or for a shorter distance until you can work up to a better pace. · Get enough rest at night. Sleeping with 1 or 2 pillows under your upper body and head may help you breathe easier. Lifestyle changes · Do not smoke. Smoking can make a heart condition worse. If you need help quitting, talk to your doctor about stop-smoking programs and medicines. These can increase your chances of quitting for good. Quitting smoking may be the most important step you can take to protect your heart. · Limit alcohol to 2 drinks a day for men and 1 drink a day for women. Too much alcohol can cause health problems. · Avoid getting sick from colds and the flu.  Get a pneumococcal vaccine shot. If you have had one before, ask your doctor whether you need another dose. Get a flu shot each year. If you must be around people with colds or the flu, wash your hands often. When should you call for help? Call 911 if you have symptoms of sudden heart failure such as: 
· You have severe trouble breathing. · You cough up pink, foamy mucus. · You have a new irregular or rapid heartbeat. Call your doctor now or seek immediate medical care if: 
· You have new or increased shortness of breath. · You are dizzy or lightheaded, or you feel like you may faint. · You have sudden weight gain, such as more than 2 to 3 pounds in a day or 5 pounds in a week. (Your doctor may suggest a different range of weight gain.) · You have increased swelling in your legs, ankles, or feet. · You are suddenly so tired or weak that you cannot do your usual activities. Watch closely for changes in your health, and be sure to contact your doctor if: 
· You develop new symptoms. Where can you learn more? Go to http://pauloWozityousaeed.info/. Enter Y105 in the search box to learn more about \"Heart Failure: Care Instructions. \" Current as of: April 3, 2017 Content Version: 11.3 © 5284-9826 [employer]. Care instructions adapted under license by [employer] (which disclaims liability or warranty for this information). If you have questions about a medical condition or this instruction, always ask your healthcare professional. [place] [address] any warranty or liability for your use of this information. DISCHARGE SUMMARY from Nurse The following personal items are in your possession at time of discharge: 
 
Dental Appliances: Lowers, Partials, With patient Visual Aid: Glasses Home Medications: None Jewelry: None Clothing: At bedside Other Valuables: [place] PATIENT INSTRUCTIONS: 
 
 
F-face looks uneven A-arms unable to move or move unevenly S-speech slurred or non-existent T-time-call 911 as soon as signs and symptoms begin-DO NOT go Back to bed or wait to see if you get better-TIME IS BRAIN. Warning Signs of HEART ATTACK Call 911 if you have these symptoms: 
? Chest discomfort. Most heart attacks involve discomfort in the center of the chest that lasts more than a few minutes, or that goes away and comes back. It can feel like uncomfortable pressure, squeezing, fullness, or pain. ? Discomfort in other areas of the upper body. Symptoms can include pain or discomfort in one or both arms, the back, neck, jaw, or stomach. ? Shortness of breath with or without chest discomfort. ? Other signs may include breaking out in a cold sweat, nausea, or lightheadedness. Don't wait more than five minutes to call [address]! Fast action can save your life. Calling 911 is almost always the fastest way to get lifesaving treatment. Emergency Medical Services staff can begin treatment when they arrive  up to an hour sooner than if someone gets to the hospital by car. Discharge Orders None [employer] Announcement We are excited to announce that we are making your provider's discharge notes available to you in [employer]. You will see these notes when they are completed and signed by the physician that discharged you from your recent hospital stay. If you have any questions or concerns about any information you see in Cura TV, please call the Health Information Department where you were seen or reach out to your Primary Care Provider for more information about your plan of care. Introducing [facility]!    
 [facility] introduces Cura TV patient portal. Now you can access parts of your medical record, email your doctor's office, and request medication refills online. 1. In your internet browser, go to [email or web address] 2. Click on the First Time User? Click Here link in the Sign In box. You will see the New Member Sign Up page. 3. Enter your [employer] Access Code exactly as it appears below. You will not need to use this code after youve completed the sign-up process. If you do not sign up before the expiration date, you must request a new code. · [employer] Access Code: [email or web address] Expires: 9/17/2017  6:05 PM 
 
4. Enter the last four digits of your Social Security Number (xxxx) and Date of Birth (mm/dd/yyyy) as indicated and click Submit. You will be taken to the next sign-up page. 5. Create a [employer] ID. This will be your [employer] login ID and cannot be changed, so think of one that is secure and easy to remember. 6. Create a [employer] password. You can change your password at any time. 7. Enter your Password Reset Question and Answer. This can be used at a later time if you forget your password. 8. Enter your e-mail address. You will receive e-mail notification when new information is available in [address] 9. Click Sign Up. You can now view and download portions of your medical record. 10. Click the Download Summary menu link to download a portable copy of your medical information. If you have questions, please visit the Frequently Asked Questions section of the [employer] website. Remember, [employer] is NOT to be used for urgent needs. For medical emergencies, dial 911. Now available from your iPhone and Android! General Information Please provide this summary of care documentation to your next provider. Patient Signature:  ____________________________________________________________ Date:  ____________________________________________________________  
  
Roselia Blake  Provider Signature: ____________________________________________________________ Date:  ____________________________________________________________

## 2017-09-07 NOTE — IP AVS SNAPSHOT
[address] 
[phone] Patient: Fernando Hernandez Sr. MRN: [id number] SPT:7/1/2921 Current Discharge Medication List  
  
START taking these medications Dose & Instructions Dispensing Information Comments Morning Noon Evening Bedtime  
 amiodarone 200 mg tablet Commonly known as:  CORDARONE Dose:  200 mg Take 1 Tab by mouth two (2) times a day. Quantity:  60 Tab Refills:  6  
     
  
   
   
  
   
  
 carvedilol 3.125 mg tablet Commonly known as:  Deleta Pina Dose:  3.125 mg Take 1 Tab by mouth two (2) times daily (with meals). Quantity:  60 Tab Refills:  6 CONTINUE these medications which have CHANGED Dose & Instructions Dispensing Information Comments Morning Noon Evening Bedtime  
 esomeprazole 40 mg capsule Commonly known as:  Alma Kirby What changed:  when to take this Dose:  40 mg Take 1 Cap by mouth two (2) times a day. Quantity:  180 Cap Refills:  3 CONTINUE these medications which have NOT CHANGED Dose & Instructions Dispensing Information Comments Morning Noon Evening Bedtime  
 allopurinol 300 mg tablet Commonly known as:  Ember Faustinildalma rosa Dose:  300 mg Take 300 mg by mouth every morning. Refills:  0  
     
  
   
   
   
  
 amoxicillin 500 mg Tab Dose:  500 mg Take 500 mg by mouth two (2) times a day. Refills:  0  
     
  
   
   
  
   
  
 aspirin 81 mg chewable tablet Dose:  81 mg Take 1 Tab by mouth daily. Refills:  0  
     
  
   
   
   
  
 atorvastatin 40 mg tablet Commonly known as:  LIPITOR Dose:  40 mg Take 40 mg by mouth daily. Refills:  0  
     
  
   
   
   
  
 citalopram 20 mg tablet Commonly known as:  Michelle Jared Dose:  20 mg Take 20 mg by mouth nightly. Refills:  0 colchicine 0.6 mg tablet Dose:  0.6 mg Take 0.6 mg by mouth as needed. Refills:  0  
     
   
   
   
  
 cpap machine kit  
   
 by Does Not Apply route. 12 cm h2o Refills:  0  
     
   
   
   
  
 ferrous sulfate 325 mg (65 mg iron) tablet Commonly known as:  Iron (Ferrous Sulfate) Dose:  325 mg Take 1 Tab by mouth Daily (before breakfast). Quantity:  90 Tab Refills:  3  
     
  
   
   
   
  
 glipiZIDE SR 5 mg CR tablet Commonly known as:  GLUCOTROL XL Dose:  5 mg Take 1 Tab by mouth two (2) times a day. Quantity:  30 Tab Refills:  0  
     
  
   
   
  
   
  
 LASIX 40 mg tablet Generic drug:  furosemide Dose:  40 mg Take 40 mg by mouth three (3) times daily. Refills:  0 LEVEMIR FLEXPEN 100 unit/mL (3 mL) Inpn Generic drug:  insulin detemir Dose:  30 Units 30 Units by SubCUTAneous route nightly. Patient states he takes 40 ml every night Refills:  0  
     
   
   
   
  
  
 potassium chloride 10 mEq tablet Commonly known as:  KLOR-CON Dose:  10 mEq Take 10 mEq by mouth two (2) times a day. Refills:  0 Where to Get Your Medications Information on where to get these meds will be given to you by the nurse or doctor. ! Ask your nurse or doctor about these medications  
  amiodarone 200 mg tablet  
 carvedilol 3.125 mg tablet

## 2017-09-07 NOTE — H&P
[facility] Cardiology History & Physical      Date of  Admission: 9/7/2017  2:28 PM     Primary Care Physician:  Dr. Dg Lofton  Primary Electrophysiologist:  Dr. Erika Hu  Primary Cardiologist:  Dr. Dax Bernard  Admitting Physician:  Dr. Dax Bernard     CC:  VT    HPI:  Gabrielle James. is a 78 y.o. male with PMH of a fib, CAD with CABG/PCI (Cath 10/2014 showed patient SILVEIRA-LAD, all other natives and VG occluded), chronic systolic HF with EF of 50% on echo 11/2013), biotronik ICD, PUD, DSL, and GERD, who presented as a direct admit for multiple episodes of VT. His home monitoring device sent alert to office. Patient had multiple episodes of VT with ATP therapy, no shocks were delivered. The patient reports feeling fairly well, but with some increased QUIJANO over past 3-4 days. Labs were checked on 9/5/17 and showed Hgb 11.9, K 4.1, Mag 2.1, and . Past Medical History:   Diagnosis Date    AICD (automatic cardioverter/defibrillator) present     ARF (acute renal failure) ([address] 5/23/2010    Arthritis     Atrial fibrillation (HCC)     CAD (coronary artery disease)     MI 1981; CABG 1981 & 1991; stents 2001 & 2010    Cancer [facility]) 2011    melanoma    Chest pain 5/23/2010    Chronic systolic heart failure ([address] 12/12/2013    Coronary atherosclerosis of artery bypass graft 12/12/2013    Coronary atherosclerosis of native coronary vessel     Diabetes ([address] 5/23/2010    Diabetes mellitus type 2, insulin dependent ([address] type 2    avg fasting sqbs 145; denies s/s hypo; last a1c 8.3    Difficult intubation     Dyslipidemia     GERD (gastroesophageal reflux disease)     Gout, chronic 5/23/2010    Heart failure ([address]     EF 25-30%on ECHO 8/2010;  Biotronik ICD 2010    HLD (hyperlipidemia) 5/23/2010    Hypertension     controlled with meds    Ill-defined condition     hyperlipidemia    Morbid obesity ([address]     Post PTCA/stent     Psychiatric disorder     depression/anxiety    PUD (peptic ulcer disease)     S/P CABG (coronary artery bypass graft)     Sleep apnea     Unspecified sleep apnea     sleeps with cpap      Past Surgical History:   Procedure Laterality Date    CABG, ARTERY-VEIN, THREE  1981    CABG, ARTERY-VEIN, THREE  1991    CARDIAC SURG PROCEDURE UNLIST  last one 5/2010 2/2010; 5/2010 LAD/SVG    HX CATARACT REMOVAL  2009    bilateral with IOL    HX CHOLECYSTECTOMY      HX COLONOSCOPY      HX GI      hemorrhoidectomy    HX HEENT      multiple eye surgeries to remove muscle    HX HEENT      throat surgery    HX IMPLANTABLE CARDIOVERTER DEFIBRILLATOR  9/2010    Biotronik ICD    HX KNEE ARTHROSCOPY      knee scope    HX PACEMAKER      HX PACEMAKER  12/12/2013    Biotronik BiV ICD       Allergies   Allergen Reactions    Flagyl [Metronidazole] Rash      Social History     Social History    Marital status:      Spouse name: N/A    Number of children: N/A    Years of education: N/A     Occupational History    Not on file.      Social History Main Topics    Smoking status: Former Smoker     Packs/day: 1.00     Years: 20.00     Quit date: 1/1/1981    Smokeless tobacco: Never Used      Comment: cigarette and pipe smoker    Alcohol use No    Drug use: No    Sexual activity: Not on file     Other Topics Concern    Not on file     Social History Narrative     Family History   Problem Relation Age of Onset    Cancer Mother     Heart Disease Father     Heart Disease Sister         Current Facility-Administered Medications   Medication Dose Route Frequency    sodium chloride (NS) flush 5-10 mL  5-10 mL IntraVENous Q8H    sodium chloride (NS) flush 5-10 mL  5-10 mL IntraVENous PRN    nitroglycerin (NITROSTAT) tablet 0.4 mg  0.4 mg SubLINGual Q5MIN PRN    acetaminophen (TYLENOL) tablet 650 mg  650 mg Oral Q4H PRN    HYDROcodone-acetaminophen (NORCO) 5-325 mg per tablet 1 Tab  1 Tab Oral Q4H PRN    morphine injection 2 mg  2 mg IntraVENous Q4H PRN    insulin regular (NOVOLIN R, HUMULIN R) injection   SubCUTAneous AC&HS    amiodarone (CORDARONE) 150 mg in dextrose 5% 100 mL bolus infusion  150 mg IntraVENous ONCE    amiodarone (CORDARONE) 450 mg in dextrose 5% 250 mL infusion  0.5-1 mg/min IntraVENous TITRATE       Review of Systems    Review of Systems   Constitution: Negative. HENT: Negative. Eyes: Negative. Cardiovascular: Positive for leg swelling. Respiratory: Positive for shortness of breath. Endocrine: Negative. Hematologic/Lymphatic: Negative. Skin: Positive for poor wound healing. Musculoskeletal: Negative. Gastrointestinal: Negative. Genitourinary: Negative. Neurological: Negative. Psychiatric/Behavioral: Negative. Allergic/Immunologic: Negative. Subjective:     Visit Vitals    /65 (BP 1 Location: Left arm, BP Patient Position: Sitting)    Pulse 78    Temp 96.6 °F (35.9 °C)    Resp 16    Wt 101.5 kg (223 lb 12.8 oz)    SpO2 97%    BMI 35.05 kg/m2     Physical Exam   Constitutional: He is oriented to person, place, and time. He appears unhealthy. HENT:   Head: Normocephalic. Eyes: Pupils are equal, round, and reactive to light. Neck: Normal range of motion. Cardiovascular: Normal rate and regular rhythm. Frequent extrasystoles are present. Exam reveals distant heart sounds. Pulmonary/Chest: Effort normal. He has decreased breath sounds. Abdominal: Soft. Bowel sounds are normal.   Musculoskeletal: He exhibits edema. Neurological: He is alert and oriented to person, place, and time. Skin: Skin is warm and dry. Lesion noted. Ulcer to right foot, lateral near 5th toe   Psychiatric: Mood, memory, affect and judgment normal.       Cardiographics  Telemetry: paced with PVCs  ECG: pending      Labs: No results found for this or any previous visit (from the past 24 hour(s)).     Patient has been seen and examined by Dr. Sadie Tomlinson and he agrees with the following assessment and plan: Assessment/Plan:       Principal Problem:    VT (ventricular tachycardia) -- admit patient to telemetry, give amiodarone bolus 150 mg x 1 and start amiodarone drip. Check BMP and mag now, repeat in AM.  Will have EP see in AM.      Active Problems:    HLD (hyperlipidemia) -- continue statin      Diabetes --  Continue home meds, start SSI and monitor BGL. Gout, chronic-- continued home meds      Chronic systolic heart failure -- , will do lasix 40 mg IV Q12h. Monitor renal function closely      Overview: EF, 35%, repeat echo is 25-30%      Atrial fibrillation -- currently paced      AICD (automatic cardioverter/defibrillator) Biotronik present-- multiple runs of VT with ATP therapy, no shock delivered. Post PTCA/stent-- no anginal symptoms.   Continue ASA, statin      Overview: 2.25x13 and 4x33 Cypher to SV to dLAD, LIMA to RI patent 5/2010      S/P CABG (coronary artery bypass graft) -- see above      Dyslipidemia -- see above      Anemia -- hgb 11.9, monitor closely      Diabetic foot ulcer-- wound care consulted      Shad Santoro NP  9/7/2017 3:39 PM

## 2017-09-07 NOTE — PROGRESS NOTES
Dual skin assessment completed with secondary RN, Gareth West. Patient sacrum is clean, dry, intact with no redness or skin breakdown. Patient has a healing ulcer on the bottom of right outer foot measuring 2.5cmX3.0cmX0.5cm. No drainage noted. Site is tender to touch. Site is scabbed with dark purple color. Heels elevated with pillows. Wound care consult in place. Will continue to monitor.

## 2017-09-08 PROBLEM — I47.20 VENTRICULAR TACHYCARDIA: Status: ACTIVE | Noted: 2017-01-01

## 2017-09-08 NOTE — PROGRESS NOTES
Problem: Falls - Risk of  Goal: *Absence of Falls  Document Ebony Fall Risk and appropriate interventions in the flowsheet.    Outcome: Progressing Towards Goal  Fall Risk Interventions:  Mobility Interventions: Patient to call before getting OOB           Medication Interventions: Patient to call before getting OOB, Teach patient to arise slowly

## 2017-09-08 NOTE — PHYSICIAN ADVISORY
Letter of Determination: Upgrade from Observation to Inpatient Status    This patient was originally hospitalized as observation status on 9/6/2017 for ventricular tachycardia identified by automatic internal cardiac defibrillator. This patient now meets for Inpatient Admission in accordance with CMS regulation Section 43 .3. Specifically, patient's stay is expected to be over Two Midnights and was medically necessary. The patient's stay was medically necessary based on his history of severe cardiomyopathy with an ejection fraction of 22%, prior Automatic Internal Cardiac Defibrillator placement, morbid obesity, poorly controlled type 2 diabetes mellitus, and a diabetic foot ulcer with labs significant for a BNP of 703 pg/ml, glucose of 220 mg/dl, vital sign instability with a pulse to 109 beats per minute. The patient was treated with continuous cardiac monitoring and Amiodarone intravenously by continuous drip. Consistent with CMS guidelines, patient meets for inpatient status. It is our recommendation that this patient's hospitalization status should be upgraded from OBSERVATION to INPATIENT status.      The final decision regarding the patient's hospitalization status depends on the attending physician's judgement.     Kaden Bob MD, DIOR,   Physician Charanjit Ramos.

## 2017-09-08 NOTE — WOUND CARE
Right plantar foot at MTP joint with 3x3cm callous, fully covered. Spouse expresses frustration that the wound does not need wound care. Discussion of history of wound which includes care from Dr. John Pierre at wound clinic and a recent referral to podiatry. Patient has an offloading boot on order, not available yet. Recommend minimizing weight bearing on area until boot is available. Offered fashioning a foam cushion for offloading while here, declined. Offered patient and spouse to use betadine paint and or dry dressing as a measure to protect callous, daily while in acute care, declined both, spouse stated \" I don't think it needs it\". Will sign off. Available if needed please call.

## 2017-09-08 NOTE — PROGRESS NOTES
[facility] CARDIOLOGY PROGRESS NOTE           9/8/2017 7:48 AM    Admit Date: 9/7/2017    Admit Diagnosis: V TACH;VT (ventricular tachycardia) ([address]      Subjective:   Patient has remained cardiac stable overnight. Objective:     Vitals:    09/08/17 0333 09/08/17 0433 09/08/17 0442 09/08/17 0533   BP: 122/72 127/69 123/69 123/69   Pulse: 66 66 75 76   Resp:   16    Temp:   97.8 °F (36.6 °C)    SpO2:   98%    Weight:   222 lb (100.7 kg)        Physical Exam:  General-Well Developed, Well Nourished, No Acute Distress, Alert & Oriented x 3, appropriate mood. Neck- supple, no JVD  CV- regular rate and rhythm no MRG  Lung- clear bilaterally  Abd- soft, nontender, nondistended  Ext- no edema bilaterally.   Skin- warm and dry    Current Facility-Administered Medications   Medication Dose Route Frequency    glipiZIDE SR (GLUCOTROL XL) tablet 5 mg  5 mg Oral BID    atorvastatin (LIPITOR) tablet 40 mg  40 mg Oral DAILY    colchicine tablet 0.6 mg  0.6 mg Oral PRN    allopurinol (ZYLOPRIM) tablet 300 mg  300 mg Oral 7am    ferrous sulfate tablet 325 mg  325 mg Oral ACB    aspirin chewable tablet 81 mg  81 mg Oral DAILY    pantoprazole (PROTONIX) tablet 40 mg  40 mg Oral ACB    citalopram (CELEXA) tablet 20 mg  20 mg Oral QHS    sodium chloride (NS) flush 5-10 mL  5-10 mL IntraVENous Q8H    sodium chloride (NS) flush 5-10 mL  5-10 mL IntraVENous PRN    nitroglycerin (NITROSTAT) tablet 0.4 mg  0.4 mg SubLINGual Q5MIN PRN    acetaminophen (TYLENOL) tablet 650 mg  650 mg Oral Q4H PRN    HYDROcodone-acetaminophen (NORCO) 5-325 mg per tablet 1 Tab  1 Tab Oral Q4H PRN    morphine injection 2 mg  2 mg IntraVENous Q4H PRN    insulin glargine (LANTUS) injection 40 Units  40 Units SubCUTAneous QHS    insulin regular (NOVOLIN R, HUMULIN R) injection   SubCUTAneous AC&HS    amiodarone (CORDARONE) 450 mg in dextrose 5% 250 mL infusion  0.5-1 mg/min IntraVENous TITRATE    furosemide (LASIX) injection 40 mg  40 mg IntraVENous Q12H     Data Review:   Recent Results (from the past 24 hour(s))   METABOLIC PANEL, BASIC    Collection Time: 09/07/17  3:30 PM   Result Value Ref Range    Sodium 140 136 - 145 mmol/L    Potassium 4.1 3.5 - 5.1 mmol/L    Chloride 104 98 - 107 mmol/L    CO2 26 21 - 32 mmol/L    Anion gap 10 7 - 16 mmol/L    Glucose 220 (H) 65 - 100 mg/dL    BUN 19 8 - 23 MG/DL    Creatinine 0.97 0.8 - 1.5 MG/DL    GFR est AA >60 >60 ml/min/1.73m2    GFR est non-AA >60 >60 ml/min/1.73m2    Calcium 8.3 8.3 - 10.4 MG/DL   MAGNESIUM    Collection Time: 09/07/17  3:30 PM   Result Value Ref Range    Magnesium 1.8 1.8 - 2.4 mg/dL   GLUCOSE, POC    Collection Time: 09/07/17  4:12 PM   Result Value Ref Range    Glucose (POC) 281 (H) 65 - 100 mg/dL   GLUCOSE, POC    Collection Time: 09/07/17  9:35 PM   Result Value Ref Range    Glucose (POC) 136 (H) 65 - 100 mg/dL   GLUCOSE, POC    Collection Time: 09/08/17  6:19 AM   Result Value Ref Range    Glucose (POC) 110 (H) 65 - 100 mg/dL     Assessment:     Principal Problem:    VT (ventricular tachycardia) ([facility] (9/7/2017)    Active Problems:    HLD (hyperlipidemia) (5/23/2010)      Diabetes ([facility] (5/23/2010)      Overview: W/o complicaiton      Gout, chronic (5/23/2010)      Chronic systolic heart failure (HCC) (12/12/2013)      Overview: EF, 35%, repeat echo is 25-30%      Atrial fibrillation (HCC) ()      AICD (automatic cardioverter/defibrillator) present ()      Overview: biotronik ICD      Post PTCA/stent ()      Overview: 2.25x13 and 4x33 Cypher to SV to dLAD, LIMA to RI patent 5/2010      S/P CABG (coronary artery bypass graft) ()      Dyslipidemia ()      Anemia (3/24/2017)      Diabetic foot ulcer ([facility] (9/7/2017)      Plan:   1. V. Tach - Secondary to end stage CAD and acute on Chronic systolic heart failure - iv amio, change to po 200mg BID on Sat am.  2. Acute on Chronic systolic heart failure - iv lasix - check ECHO. NO ACEI/ARB secondary to hypotension. Attempt to add Coreg 3.125mg BID. Iv lasix  3. A. Fib - Currently in NSR. NO Anticoagulation secondary to recent bleeding  4. CAD - S/P CABG - addressed by Dr. Tasha Hdz - felt to be end stage CAD  5. Diabetic foot ulcer/DM - Consult hospitalist service for further management - Wound care has been consulted        Lenard Perez. Pamela Bowie M.D., F.A.C.C, F.H.R.S.   Cardiology/Electrophysiology

## 2017-09-08 NOTE — PROGRESS NOTES
Verbal bedside report given to [facility], oncoming RN. Patient's situation, background, assessment and recommendations provided. Opportunity for questions provided. Oncoming RN assumed care of patient. Amio IV drip verified at bedside with oncoming RN.

## 2017-09-08 NOTE — CONSULTS
HOSPITALIST H&P/CONSULT  NAME:  Helen Henson Sr.   Age:  78 y.o.  :   1940   MRN:   [id number]  PCP: Mere Oliveros MD  Consulting MD:  Treatment Team: Attending Provider: Frank Duckworth MD; Consulting Provider: Lazaro Mar MD; Utilization Review: Robbin Jones  HPI:   Mr. Jaz Brannon is a 77 YO male patient with a PMH of afib, CAD s/p CABG/PCI, systolic CHF with EF of 53%, s/p ICD placement, DM type II and obesity who was admitted due to multiple episodes of VTach. He has been placed on an amiodarone drip in the hospital. The hospitalist group has been consulted to help with his DM management and due to a chronic ulceration in the R foot. I have had a long conversation with patient and his wife. He has been followed by the wound care center and by Dr Jackie Otero in the past due to his wound in the R foot. She has been told by his PCP to get another appointment with the wound care center. He was recently treated with oral antibiotics for this wound. At my physical exam he has a callus in the lateral aspect of the sole of the R foot. The callus has a superficial ulceration but there is no exposition of subcutaneous tissue. There is NO evidence of acute infection and I would not recommend any acute intervention except for wound care consult for in-hospital wound care. Regarding his diabetes, he says he has been using Levemir 30 U at bedtime + glipizide XL 5mg po bid. He reports some episodes of hypoglycemia in the early morning while at home. He has a fairly well control of his diabetes with this regimen. He received 40 U of Lantus last night. His BS this am was 110 mg/dL. 10 point ROS done and is negative except as noted in HPI.   Past Medical History:   Diagnosis Date    AICD (automatic cardioverter/defibrillator) present     ARF (acute renal failure) ([facility] [address] 2010    Arthritis     Atrial fibrillation ([facility] [facility]     CAD (coronary artery disease)     MI ; CABG [address]; stents 2001 & 2010    Cancer [facility]) 2011    melanoma    Chest pain 5/23/2010    Chronic systolic heart failure ([address] 12/12/2013    Coronary atherosclerosis of artery bypass graft 12/12/2013    Coronary atherosclerosis of native coronary vessel     Diabetes ([facility] [address] 5/23/2010    Diabetes mellitus type 2, insulin dependent ([address] type 2    avg fasting sqbs 145; denies s/s hypo; last a1c 8.3    Difficult intubation     Dyslipidemia     GERD (gastroesophageal reflux disease)     Gout, chronic 5/23/2010    Heart failure ([address]     EF 25-30%on ECHO 8/2010; [employer] ICD 2010    HLD (hyperlipidemia) 5/23/2010    Hypertension     controlled with meds    Ill-defined condition     hyperlipidemia    Morbid obesity ([address]     Post PTCA/stent     Psychiatric disorder     depression/anxiety    PUD (peptic ulcer disease)     S/P CABG (coronary artery bypass graft)     Sleep apnea     Unspecified sleep apnea     sleeps with cpap      Past Surgical History:   Procedure Laterality Date    CABG, ARTERY-VEIN, THREE  1981    CABG, ARTERY-VEIN, THREE  1991    CARDIAC SURG PROCEDURE UNLIST  last one 5/2010 2/2010; 5/2010 LAD/SVG    HX CATARACT REMOVAL  2009    bilateral with IOL    HX CHOLECYSTECTOMY      HX COLONOSCOPY      HX GI      hemorrhoidectomy    HX HEENT      multiple eye surgeries to remove muscle    HX HEENT      throat surgery    HX IMPLANTABLE CARDIOVERTER DEFIBRILLATOR  9/2010    [employer] ICD    HX KNEE ARTHROSCOPY      knee scope    HX PACEMAKER      HX PACEMAKER  12/12/2013    [employer] BiV ICD      Prior to Admission Medications   Prescriptions Last Dose Informant Patient Reported? Taking?   allopurinol (ZYLOPRIM) 300 mg tablet   Yes No   Sig: Take 300 mg by mouth every morning. amoxicillin 500 mg tab   Yes Yes   Sig: Take 500 mg by mouth two (2) times a day. aspirin 81 mg chewable tablet   Yes No   Sig: Take 1 Tab by mouth daily.    atorvastatin (LIPITOR) 40 mg tablet   Yes No   Sig: Take 40 mg by mouth daily. citalopram (CELEXA) 20 mg tablet   Yes No   Sig: Take 20 mg by mouth nightly. colchicine 0.6 mg tablet   Yes No   Sig: Take 0.6 mg by mouth as needed. cpap machine kit   Yes No   Sig: by Does Not Apply route. 12 cm h2o   esomeprazole (NEXIUM) 40 mg capsule   No No   Sig: Take 1 Cap by mouth two (2) times a day. Patient taking differently: Take 40 mg by mouth daily. ferrous sulfate (IRON, FERROUS SULFATE,) 325 mg (65 mg iron) tablet   No No   Sig: Take 1 Tab by mouth Daily (before breakfast). furosemide (LASIX) 40 mg tablet   Yes No   Sig: Take 40 mg by mouth three (3) times daily. glipiZIDE SR (GLUCOTROL XL) 5 mg CR tablet   No No   Sig: Take 1 Tab by mouth two (2) times a day. insulin detemir (LEVEMIR FLEXPEN) 100 unit/mL (3 mL) pen   Yes No   Si Units by SubCUTAneous route nightly. Patient states he takes 40 ml every night   potassium chloride (K-DUR, KLOR-CON) 10 mEq tablet   Yes No   Sig: Take 10 mEq by mouth two (2) times a day. Facility-Administered Medications: None     Home meds reconciled. Allergies   Allergen Reactions    Flagyl [Metronidazole] Rash      Social History   Substance Use Topics    Smoking status: Former Smoker     Packs/day: 1.00     Years: 20.00     Quit date: 1981    Smokeless tobacco: Never Used      Comment: cigarette and pipe smoker    Alcohol use No      Family History   Problem Relation Age of Onset    Cancer Mother     Heart Disease Father     Heart Disease Sister         There is no immunization history on file for this patient.   Objective:     Visit Vitals    /70 (BP 1 Location: Left arm, BP Patient Position: At rest)    Pulse 71    Temp 96.9 °F (36.1 °C)    Resp 18    Wt 100.7 kg (222 lb)    SpO2 92%    BMI 34.77 kg/m2      Temp (24hrs), Av.4 °F (36.3 °C), Min:96.5 °F (35.8 °C), Max:98.3 °F (36.8 °C)    Oxygen Therapy  O2 Sat (%): 92 % (17 1240)  O2 Device: Room air (17 1234)  Physical Exam:  General:    Alert, cooperative, mild  distress   Head:   NCAT. No obvious deformity  Nose:  Nares normal. No drainage  Lungs:   CTABL. No wheezing/rhonchi/rales  Heart:   RRR. No m/r/g. Abdomen:   S/nt/nd. Bowel sounds normal.   Extremities: callus in the lateral aspect of the sole of the R foot. Callus has a superficial ulceration. Skin:     No rashes or lesions. Not Jaundiced  Neurologic: Moves all extremities.   Mild bilateral leg edema       Data Review:   Recent Results (from the past 24 hour(s))   METABOLIC PANEL, BASIC    Collection Time: 09/07/17  3:30 PM   Result Value Ref Range    Sodium 140 136 - 145 mmol/L    Potassium 4.1 3.5 - 5.1 mmol/L    Chloride 104 98 - 107 mmol/L    CO2 26 21 - 32 mmol/L    Anion gap 10 7 - 16 mmol/L    Glucose 220 (H) 65 - 100 mg/dL    BUN 19 8 - 23 MG/DL    Creatinine 0.97 0.8 - 1.5 MG/DL    GFR est AA >60 >60 ml/min/1.73m2    GFR est non-AA >60 >60 ml/min/1.73m2    Calcium 8.3 8.3 - 10.4 MG/DL   MAGNESIUM    Collection Time: 09/07/17  3:30 PM   Result Value Ref Range    Magnesium 1.8 1.8 - 2.4 mg/dL   GLUCOSE, POC    Collection Time: 09/07/17  4:12 PM   Result Value Ref Range    Glucose (POC) 281 (H) 65 - 100 mg/dL   GLUCOSE, POC    Collection Time: 09/07/17  9:35 PM   Result Value Ref Range    Glucose (POC) 136 (H) 65 - 100 mg/dL   GLUCOSE, POC    Collection Time: 09/08/17  6:19 AM   Result Value Ref Range    Glucose (POC) 110 (H) 65 - 874 mg/dL   METABOLIC PANEL, BASIC    Collection Time: 09/08/17  6:32 AM   Result Value Ref Range    Sodium 140 136 - 145 mmol/L    Potassium 3.9 3.5 - 5.1 mmol/L    Chloride 104 98 - 107 mmol/L    CO2 26 21 - 32 mmol/L    Anion gap 10 7 - 16 mmol/L    Glucose 111 (H) 65 - 100 mg/dL    BUN 18 8 - 23 MG/DL    Creatinine 0.85 0.8 - 1.5 MG/DL    GFR est AA >60 >60 ml/min/1.73m2    GFR est non-AA >60 >60 ml/min/1.73m2    Calcium 8.4 8.3 - 10.4 MG/DL   CBC W/O DIFF    Collection Time: 09/08/17  6:32 AM   Result Value Ref Range    WBC 6.7 4.3 - 11.1 K/uL    RBC 3.99 (L) 4.23 - 5.67 M/uL    HGB 11.8 (L) 13.6 - 17.2 g/dL    HCT 34.9 (L) 41.1 - 50.3 %    MCV 87.5 79.6 - 97.8 FL    MCH 29.6 26.1 - 32.9 PG    MCHC 33.8 31.4 - 35.0 g/dL    RDW 18.9 (H) 11.9 - 14.6 %    PLATELET 91 (L) 390 - 450 K/uL    MPV 11.1 10.8 - 14.1 FL   MAGNESIUM    Collection Time: 09/08/17  6:32 AM   Result Value Ref Range    Magnesium 2.0 1.8 - 2.4 mg/dL   GLUCOSE, POC    Collection Time: 09/08/17 11:30 AM   Result Value Ref Range    Glucose (POC) 232 (H) 65 - 100 mg/dL     Imaging /Procedures /Studies:  I personally reviewed all labs, imaging, and other studies this admission:  CXR Results  (Last 48 hours)               09/07/17 1555  XR CHEST SNGL V Final result    Impression:  IMPRESSION:  Negative for acute change                    Narrative:  CHEST X-RAY, one view. HISTORY:  Shortness of breath. TECHNIQUE:  AP semiupright view       COMPARISON: 14 December 2013. FINDINGS:   Lungs are clear. Pulmonary vasculature is unremarkable. Heart is   enlarged. There is a pacemaker and sternal wires. CT Results  (Last 48 hours)    None          Assessment and Plan:      Active Hospital Problems    Diagnosis Date Noted    Ventricular tachycardia ([facility] [address] 09/08/2017    VT (ventricular tachycardia) ([facility] [address] 09/07/2017    Diabetic foot ulcer ([address] 09/07/2017    Anemia 03/24/2017    S/P CABG (coronary artery bypass graft)     Post PTCA/stent      2.25x13 and 4x33 [place] to SV to dLAD, [place] to [place] patent 5/2010      Dyslipidemia     Atrial fibrillation (HCC)     AICD (automatic cardioverter/defibrillator) present      biotronik ICD      Chronic systolic heart failure ([address] 12/12/2013     EF, 35%, repeat echo is 25-30%      HLD (hyperlipidemia) 05/23/2010    Gout, chronic 05/23/2010    Diabetes ([address] 07/96/0411     W/o complicaiton         PLAN    # DM type II   -Will decrease dose of glipizide to 5 mg po ONLY in the morning   -continue Lantus 40 U at bedtime   -continue humalog SS   -switched to a cardiac/diabetic diet   -monitor Hgb A1C   -given a history of hypoglycemia, I would not be aggressive with his BS control while in the hospital.     #Chronic ulcerated callus in the R foot   -callus with superficial ulceration   -no deep ulcer   -has has chronic follow up with Dr Molly Kovacs and wound care center   -no need for acute intervention except for local wound care     Thanks for allowing us to participate in the care of  This patient. We will follow case with you.      Signed By: Jolanta Posadas MD     September 8, 2017

## 2017-09-08 NOTE — PROGRESS NOTES
Spiritual Care visit. Initial visit. Physician was with patient. Patient to be seen later.     Visit by Daina Crawford M.Ed., Th.B. ,Staff

## 2017-09-09 NOTE — DISCHARGE INSTRUCTIONS
Learning About ICDs (Implantable Cardioverter-Defibrillators)  What is an ICD (implantable cardioverter-defibrillator)? An ICD (implantable cardioverter-defibrillator) is a small, battery-powered device. It fixes serious changes in your heartbeat. ICDs are used in people who have had a life-threatening heart rhythm or are at high risk of having one. The ICD is placed inside the chest. It's attached to one or two wires (called leads) that go into the heart through a vein. How does an ICD work? An ICD is always checking your heart rate and rhythm. If the ICD detects a life-threatening, rapid heart rhythm, it tries to slow the rhythm to get it back to normal. If the bad rhythm doesn't stop, the ICD sends an electric shock to the heart. This restores a normal rhythm. The device then goes back to its watchful mode. An ICD also can fix a heart rate that is too fast or too slow. It does this without using a shock. It can send out electrical pulses to speed up a heart rate that is too slow. Or it can slow down a fast heart rate by matching the pace and bringing the heart rate back to normal.  Your doctor will check the ICD regularly to make sure it is working right and isn't causing any problems. Your doctor will also check the battery to see if it needs to be replaced. How is an ICD placed? Your doctor will put the ICD under the skin in your chest during minor surgery. You will likely have medicine to make you feel relaxed and sleepy during the surgery. Your doctor makes a small cut (incision) in your upper chest. He or she puts one or two leads (wires) in a vein and threads them to the heart. Your doctor connects the leads to the ICD and places it in your chest. Then the incision is closed. Your doctor also programs the ICD. Most people spend the night in the hospital, just to make sure that the device is working and that there are no problems from the surgery. How does it feel to get a shock?   The shock from an ICD hurts briefly. People feel it in different ways. It's been described as feeling like a punch in the chest. But the shock is a sign that the ICD is doing its job. It's there to save your life. You won't feel any pain if the ICD uses electrical pulses to fix a heart rate that is too fast or too slow. There's no way to know how often a shock might occur. It might never happen. Not knowing when or if a shock might happen may make you nervous. Knowing what to do if you get shocked can help. Ask your doctor for an action plan. This plan will guide you step-by-step if a shock happens. What else should you know? You can live a normal life with your ICD. Here are a few tips for living well with your ICD. · Avoid strong magnetic and electrical fields. These can keep your device from working right. Most office equipment and home appliances are safe to use. Check with your doctor about which things you should use with caution and which you should stay away from. · Be sure that any doctor, dentist, or other health professional you see knows that you have an ICD. · Always carry a card that tells what kind of device you have. Wear medical alert jewelry that says you have an ICD. You can buy this at most drugstores. · If you do get a shock, follow your action plan for what to do. · You can lead an active life with an ICD. Ask your doctor what sort of activity and intensity is safe for you. As you plan for your future and your end of life, be sure to include plans for your ICD. You can make the decision to turn off your ICD as part of the medical treatment you want at the end of life. Follow-up care is a key part of your treatment and safety. Be sure to make and go to all appointments, and call your doctor if you are having problems. It's also a good idea to know your test results and keep a list of the medicines you take. Where can you learn more? Go to http://paulo-saeed.info/.   Enter F189 in the search box to learn more about \"Learning About ICDs (Implantable Cardioverter-Defibrillators). \"  Current as of: September 29, 2016  Content Version: 11.3  © 6207-6748 [employer]. Care instructions adapted under license by [employer] (which disclaims liability or warranty for this information). If you have questions about a medical condition or this instruction, always ask your healthcare professional. [address] any warranty or liability for your use of this information. Avoiding Triggers With Heart Failure: Care Instructions  Your Care Instructions  Triggers are anything that make your heart failure flare up. A flare-up is also called \"sudden heart failure\" or \"acute heart failure. \" When you have a flare-up, fluid builds up in your lungs, and you have problems breathing. You might need to go to the hospital. By watching for changes in your condition and avoiding triggers, you can prevent heart failure flare-ups. Follow-up care is a key part of your treatment and safety. Be sure to make and go to all appointments, and call your doctor if you are having problems. It's also a good idea to know your test results and keep a list of the medicines you take. How can you care for yourself at home? Watch for changes in your weight and condition  · Weigh yourself without clothing at the same time each day. Record your weight. Call your doctor if you have sudden weight gain, such as more than 2 to 3 pounds in a day or 5 pounds in a week. (Your doctor may suggest a different range of weight gain.) A sudden weight gain may mean that your heart failure is getting worse. · Keep a daily record of your symptoms. Write down any changes in how you feel, such as new shortness of breath, cough, or problems eating.  Also record if your ankles are more swollen than usual and if you feel more tired than usual. Note anything that you ate or did that could have triggered these changes. Limit sodium  Sodium causes your body to hold on to extra water. This may cause your heart failure symptoms to get worse. People get most of their sodium from processed foods. Fast food and restaurant meals also tend to be very high in sodium. · Your doctor may suggest that you limit sodium to 2,000 milligrams (mg) a day or less. That is less than 1 teaspoon of salt a day, including all the salt you eat in cooking or in packaged foods. · Read food labels on cans and food packages. They tell you how much sodium you get in one serving. Check the serving size. If you eat more than one serving, you are getting more sodium. · Be aware that sodium can come in forms other than salt, including monosodium glutamate (MSG), sodium citrate, and sodium bicarbonate (baking soda). MSG is often added to Asian food. You can sometimes ask for food without MSG or salt. · Slowly reducing salt will help you adjust to the taste. Take the salt shaker off the table. · Flavor your food with garlic, lemon juice, onion, vinegar, herbs, and spices instead of salt. Do not use soy sauce, steak sauce, onion salt, garlic salt, mustard, or ketchup on your food, unless it is labeled \"low-sodium\" or \"low-salt. \"  · Make your own salad dressings, sauces, and ketchup without adding salt. · Use fresh or frozen ingredients, instead of canned ones, whenever you can. Choose low-sodium canned goods. · Eat less processed food and food from restaurants, including fast food. Exercise as directed  Moderate, regular exercise is very good for your heart. It improves your blood flow and helps control your weight. But too much exercise can stress your heart and cause a heart failure flare-up. · Check with your doctor before you start an exercise program.  · Walking is an easy way to get exercise. Start out slowly. Gradually increase the length and pace of your walk.  Swimming, riding a bike, and using a treadmill are also good forms of exercise. · When you exercise, watch for signs that your heart is working too hard. You are pushing yourself too hard if you cannot talk while you are exercising. If you become short of breath or dizzy or have chest pain, stop, sit down, and rest.  · Do not exercise when you do not feel well. Take medicines correctly  · Take your medicines exactly as prescribed. Call your doctor if you think you are having a problem with your medicine. · Make a list of all the medicines you take. Include those prescribed to you by other doctors and any over-the-counter medicines, vitamins, or supplements you take. Take this list with you when you go to any doctor. · Take your medicines at the same time every day. It may help you to post a list of all the medicines you take every day and what time of day you take them. · Make taking your medicine as simple as you can. Plan times to take your medicines when you are doing other things, such as eating a meal or getting ready for bed. This will make it easier to remember to take your medicines. · Get organized. Use helpful tools, such as daily or weekly pill containers. When should you call for help? Call 911 if you have symptoms of sudden heart failure such as:  · You have severe trouble breathing. · You cough up pink, foamy mucus. · You have a new irregular or rapid heartbeat. Call your doctor now or seek immediate medical care if:  · You have new or increased shortness of breath. · You are dizzy or lightheaded, or you feel like you may faint. · You have sudden weight gain, such as more than 2 to 3 pounds in a day or 5 pounds in a week. (Your doctor may suggest a different range of weight gain.)  · You have increased swelling in your legs, ankles, or feet. · You are suddenly so tired or weak that you cannot do your usual activities. Watch closely for changes in your health, and be sure to contact your doctor if you develop new symptoms. Where can you learn more?   Go to http://paulo-saeed.info/. Enter X163 in the search box to learn more about \"Avoiding Triggers With Heart Failure: Care Instructions. \"  Current as of: February 23, 2017  Content Version: 11.3  © 7667-5379 [employer]. Care instructions adapted under license by [employer] (which disclaims liability or warranty for this information). If you have questions about a medical condition or this instruction, always ask your healthcare professional. [address] any warranty or liability for your use of this information. Heart Failure: Care Instructions  Your Care Instructions    Heart failure occurs when your heart does not pump as much blood as the body needs. Failure does not mean that the heart has stopped pumping but rather that it is not pumping as well as it should. Over time, this causes fluid buildup in your lungs and other parts of your body. Fluid buildup can cause shortness of breath, fatigue, swollen ankles, and other problems. By taking medicines regularly, reducing sodium (salt) in your diet, checking your weight every day, and making lifestyle changes, you can feel better and live longer. Follow-up care is a key part of your treatment and safety. Be sure to make and go to all appointments, and call your doctor if you are having problems. It's also a good idea to know your test results and keep a list of the medicines you take. How can you care for yourself at home? Medicines  · Be safe with medicines. Take your medicines exactly as prescribed. Call your doctor if you think you are having a problem with your medicine. · Do not take any vitamins, over-the-counter medicine, or herbal products without talking to your doctor first. [place] not take ibuprofen (Advil or Motrin) and naproxen (Aleve) without talking to your doctor first. They could make your heart failure worse. · You may be taking some of the following medicine.   ¨ Beta-blockers can slow heart rate, decrease blood pressure, and improve your condition. Taking a beta-blocker may lower your chance of needing to be hospitalized. ¨ Angiotensin-converting enzyme inhibitors (ACEIs) reduce the heart's workload, lower blood pressure, and reduce swelling. Taking an ACEI may lower your chance of needing to be hospitalized again. ¨ Angiotensin II receptor blockers (ARBs) work like ACEIs. Your doctor may prescribe them instead of ACEIs. ¨ Diuretics, also called water pills, reduce swelling. ¨ Potassium supplements replace this important mineral, which is sometimes lost with diuretics. ¨ Aspirin and other blood thinners prevent blood clots, which can cause a stroke or heart attack. You will get more details on the specific medicines your doctor prescribes. Diet  · Your doctor may suggest that you limit sodium to 2,000 milligrams (mg) a day or less. That is less than 1 teaspoon of salt a day, including all the salt you eat in cooking or in packaged foods. People get most of their sodium from processed foods. Fast food and restaurant meals also tend to be very high in sodium. · Ask your doctor how much liquid you can drink each day. You may have to limit liquids. Weight  · Weigh yourself without clothing at the same time each day. Record your weight. Call your doctor if you have a sudden weight gain, such as more than 2 to 3 pounds in a day or 5 pounds in a week. (Your doctor may suggest a different range of weight gain.) A sudden weight gain may mean that your heart failure is getting worse. Activity level  · Start light exercise (if your doctor says it is okay). Even if you can only do a small amount, exercise will help you get stronger, have more energy, and manage your weight and your stress. Walking is an easy way to get exercise. Start out by walking a little more than you did before. Bit by bit, increase the amount you walk.   · When you exercise, watch for signs that your heart is working too hard. You are pushing yourself too hard if you cannot talk while you are exercising. If you become short of breath or dizzy or have chest pain, stop, sit down, and rest.  · If you feel \"wiped out\" the day after you exercise, walk slower or for a shorter distance until you can work up to a better pace. · Get enough rest at night. Sleeping with 1 or 2 pillows under your upper body and head may help you breathe easier. Lifestyle changes  · Do not smoke. Smoking can make a heart condition worse. If you need help quitting, talk to your doctor about stop-smoking programs and medicines. These can increase your chances of quitting for good. Quitting smoking may be the most important step you can take to protect your heart. · Limit alcohol to 2 drinks a day for men and 1 drink a day for women. Too much alcohol can cause health problems. · Avoid getting sick from colds and the flu. Get a pneumococcal vaccine shot. If you have had one before, ask your doctor whether you need another dose. Get a flu shot each year. If you must be around people with colds or the flu, wash your hands often. When should you call for help? Call 911 if you have symptoms of sudden heart failure such as:  · You have severe trouble breathing. · You cough up pink, foamy mucus. · You have a new irregular or rapid heartbeat. Call your doctor now or seek immediate medical care if:  · You have new or increased shortness of breath. · You are dizzy or lightheaded, or you feel like you may faint. · You have sudden weight gain, such as more than 2 to 3 pounds in a day or 5 pounds in a week. (Your doctor may suggest a different range of weight gain.)  · You have increased swelling in your legs, ankles, or feet. · You are suddenly so tired or weak that you cannot do your usual activities. Watch closely for changes in your health, and be sure to contact your doctor if:  · You develop new symptoms. Where can you learn more?   Go to http://paulo-saeed.info/. Enter D405 in the search box to learn more about \"Heart Failure: Care Instructions. \"  Current as of: April 3, 2017  Content Version: 11.3  © 9923-8243 [employer]. Care instructions adapted under license by [employer] (which disclaims liability or warranty for this information). If you have questions about a medical condition or this instruction, always ask your healthcare professional. [place] [address] any warranty or liability for your use of this information. DISCHARGE SUMMARY from Nurse    The following personal items are in your possession at time of discharge:    Dental Appliances: Lowers, Partials, With patient  Visual Aid: Glasses     Home Medications: None  Jewelry: None  Clothing: At bedside  Other Valuables: Eyeglasses             PATIENT INSTRUCTIONS:    After general anesthesia or intravenous sedation, for 24 hours or while taking prescription Narcotics:  · Limit your activities  · Do not drive and operate hazardous machinery  · Do not make important personal or business decisions  · Do  not drink alcoholic beverages  · If you have not urinated within 8 hours after discharge, please contact your surgeon on call. Report the following to your surgeon:  · Excessive pain, swelling, redness or odor of or around the surgical area  · Temperature over 100.5  · Nausea and vomiting lasting longer than 4 hours or if unable to take medications  · Any signs of decreased circulation or nerve impairment to extremity: change in color, persistent  numbness, tingling, coldness or increase pain  · Any questions        What to do at Home:  Recommended activity: Activity as tolerated,       *  Please give a list of your current medications to your Primary Care Provider.     *  Please update this list whenever your medications are discontinued, doses are      changed, or new medications (including over-the-counter products) are added.    *  Please carry medication information at all times in case of emergency situations. These are general instructions for a healthy lifestyle:    No smoking/ No tobacco products/ Avoid exposure to second hand smoke    Surgeon General's Warning:  Quitting smoking now greatly reduces serious risk to your health. Obesity, smoking, and sedentary lifestyle greatly increases your risk for illness    A healthy diet, regular physical exercise & weight monitoring are important for maintaining a healthy lifestyle    You may be retaining fluid if you have a history of heart failure or if you experience any of the following symptoms:  Weight gain of 3 pounds or more overnight or 5 pounds in a week, increased swelling in our hands or feet or shortness of breath while lying flat in bed. Please call your doctor as soon as you notice any of these symptoms; do not wait until your next office visit. Recognize signs and symptoms of STROKE:    F-face looks uneven    A-arms unable to move or move unevenly    S-speech slurred or non-existent    T-time-call 911 as soon as signs and symptoms begin-DO NOT go       Back to bed or wait to see if you get better-TIME IS BRAIN. Warning Signs of HEART ATTACK     Call 911 if you have these symptoms:   Chest discomfort. Most heart attacks involve discomfort in the center of the chest that lasts more than a few minutes, or that goes away and comes back. It can feel like uncomfortable pressure, squeezing, fullness, or pain.  Discomfort in other areas of the upper body. Symptoms can include pain or discomfort in one or both arms, the back, neck, jaw, or stomach.  Shortness of breath with or without chest discomfort.  Other signs may include breaking out in a cold sweat, nausea, or lightheadedness. Don't wait more than five minutes to call 911 - MINUTES MATTER! Fast action can save your life. Calling 911 is almost always the fastest way to get lifesaving treatment. Emergency Medical Services staff can begin treatment when they arrive -- up to an hour sooner than if someone gets to the hospital by car.

## 2017-09-09 NOTE — PROGRESS NOTES
Bedside and Verbal shift change report received from [facility], [facility]. Report included the following information SBAR, Kardex, Procedure Summary, Intake/Output, MAR, Recent Results and Cardiac Rhythm paced.

## 2017-09-09 NOTE — PROGRESS NOTES
Discharge instructions reviewed with patient and spouse. Prescriptions given and med info sheets provided for all new medications. Opportunity for questions provided. Patient and spouse voiced understanding of all discharge instructions. Pt understands to call for wheelchair. IV and telemetry monitor taken off and returned.

## 2017-09-09 NOTE — PROGRESS NOTES
Verbal bedside report given to [facility], oncoming RN. Patient's situation, background, assessment and recommendations provided. Opportunity for questions provided. Oncoming RN assumed care of patient. Amio IV drip verified at bedside with oncoming RN.

## 2017-09-09 NOTE — DISCHARGE SUMMARY
Physician Discharge Summary     Patient ID:  Breanna De Jesus  [id number]  78 y.o.  1940    Admit date: 9/7/2017    Discharge date and time: 9/9/17    Admitting Physician: Millie Gonzales MD     Primary Cardiologist:Dr. Leno Fajardo    Primary Care MD:Jonathon Turner MD    Discharge Physician: Zelalem Vaughan NP/ Dr. Lauren Jack    Admission Diagnoses: V TACH  VT (ventricular tachycardia) [facility])  Ventricular tachycardia [facility])    Discharge Diagnoses:   Patient Active Problem List    Diagnosis Date Noted    Ventricular tachycardia ([facility] [address] 09/08/2017    VT (ventricular tachycardia) ([facility] [address] 09/07/2017    Diabetic foot ulcer ([facility] [address] 09/07/2017    Anemia 03/24/2017    Atrial fibrillation ([facility] [address]     AICD (automatic cardioverter/defibrillator) present     Coronary atherosclerosis of native coronary vessel     Sleep apnea     Post PTCA/stent     S/P CABG (coronary artery bypass graft)     Dyslipidemia     Chronic systolic heart failure ([facility] [address] 12/12/2013    Coronary atherosclerosis of artery bypass graft 12/12/2013    Chest pain 05/23/2010    ARF (acute renal failure) ([facility] [address] 05/23/2010    HLD (hyperlipidemia) 05/23/2010    Diabetes ([facility] [address] 05/23/2010    Gout, chronic 05/23/2010           Hospital Course: Breanna De Jesus is a 78 y.o. male with PMH of a fib, CAD with CABG/PCI (Cath 10/2014 showed patient SILVEIRA-LAD, all other natives and VG occluded), chronic systolic HF with EF of 68% on echo 11/2013), biotronik ICD, PUD, DSL, and GERD, who presented as a direct admit for multiple episodes of VT. His home monitoring device sent alert to office. Patient had multiple episodes of VT with ATP therapy, no shocks were delivered. The patient reports feeling fairly well, but with some increased QUIJANO over past 3-4 days. Labs were checked on 9/5/17 and showed Hgb 11.9, K 4.1, Mag 2.1, and . He was admitted for further evaluation. IV amiodarone was intiated with suppression of arrhythmia.  He was successfully transitioned to PO amiodarone. He was seen and evaluated by Dr. Jennifer George and felt to be acceptable for discharge. Discharge Exam:     Visit Vitals    /60 (BP 1 Location: Left arm, BP Patient Position: At rest)    Pulse 62    Temp 97.4 °F (36.3 °C)    Resp 18    Wt 100.7 kg (221 lb 14.4 oz)    SpO2 97%    BMI 34.75 kg/m2     General Appearance:  Well developed, well nourished,alert and oriented x 3, and individual in no acute distress. Ears/Nose/Mouth/Throat:   Hearing grossly normal.         Neck: Supple. Chest:   Lungs clear to auscultation bilaterally. Cardiovascular:  Regular rate and rhythm, S1, S2 normal, no murmur. Abdomen:   Soft, non-tender, bowel sounds are active. Extremities: No edema bilaterally. Skin: Warm and dry.                  Final Laboratory Data:Recent Results (from the past 24 hour(s))   GLUCOSE, POC    Collection Time: 09/08/17 11:30 AM   Result Value Ref Range    Glucose (POC) 232 (H) 65 - 100 mg/dL   GLUCOSE, POC    Collection Time: 09/08/17  4:24 PM   Result Value Ref Range    Glucose (POC) 117 (H) 65 - 100 mg/dL   GLUCOSE, POC    Collection Time: 09/08/17 10:04 PM   Result Value Ref Range    Glucose (POC) 146 (H) 65 - 906 mg/dL   METABOLIC PANEL, BASIC    Collection Time: 09/09/17  5:31 AM   Result Value Ref Range    Sodium 140 136 - 145 mmol/L    Potassium 3.4 (L) 3.5 - 5.1 mmol/L    Chloride 103 98 - 107 mmol/L    CO2 26 21 - 32 mmol/L    Anion gap 11 7 - 16 mmol/L    Glucose 117 (H) 65 - 100 mg/dL    BUN 18 8 - 23 MG/DL    Creatinine 0.87 0.8 - 1.5 MG/DL    GFR est AA >60 >60 ml/min/1.73m2    GFR est non-AA >60 >60 ml/min/1.73m2    Calcium 8.6 8.3 - 10.4 MG/DL   CBC W/O DIFF    Collection Time: 09/09/17  5:31 AM   Result Value Ref Range    WBC 6.9 4.3 - 11.1 K/uL    RBC 4.01 (L) 4.23 - 5.67 M/uL    HGB 11.8 (L) 13.6 - 17.2 g/dL    HCT 35.5 (L) 41.1 - 50.3 %    MCV 88.5 79.6 - 97.8 FL    MCH 29.4 26.1 - 32.9 PG    MCHC 33.2 31.4 - 35.0 g/dL    RDW 19.0 (H) 11.9 - 14.6 %    PLATELET 95 (L) 634 - 450 K/uL    MPV 10.7 (L) 10.8 - 14.1 FL   MAGNESIUM    Collection Time: 09/09/17  5:31 AM   Result Value Ref Range    Magnesium 2.0 1.8 - 2.4 mg/dL   GLUCOSE, POC    Collection Time: 09/09/17  6:34 AM   Result Value Ref Range    Glucose (POC) 109 (H) 65 - 100 mg/dL       Disposition: home    Patient Instructions:   Current Discharge Medication List      START taking these medications    Details   amiodarone (CORDARONE) 200 mg tablet Take 1 Tab by mouth two (2) times a day. Qty: 60 Tab, Refills: 6      carvedilol (COREG) 3.125 mg tablet Take 1 Tab by mouth two (2) times daily (with meals). Qty: 60 Tab, Refills: 6    Associated Diagnoses: Chronic systolic heart failure ([facility] [address]         CONTINUE these medications which have NOT CHANGED    Details   amoxicillin 500 mg tab Take 500 mg by mouth two (2) times a day. furosemide (LASIX) 40 mg tablet Take 40 mg by mouth three (3) times daily. aspirin 81 mg chewable tablet Take 1 Tab by mouth daily. esomeprazole (NEXIUM) 40 mg capsule Take 1 Cap by mouth two (2) times a day. Qty: 180 Cap, Refills: 3      ferrous sulfate (IRON, FERROUS SULFATE,) 325 mg (65 mg iron) tablet Take 1 Tab by mouth Daily (before breakfast). Qty: 90 Tab, Refills: 3      colchicine 0.6 mg tablet Take 0.6 mg by mouth as needed. cpap machine kit by Does Not Apply route. 12 cm h2o      atorvastatin (LIPITOR) 40 mg tablet Take 40 mg by mouth daily. citalopram (CELEXA) 20 mg tablet Take 20 mg by mouth nightly. insulin detemir (LEVEMIR FLEXPEN) 100 unit/mL (3 mL) pen 30 Units by SubCUTAneous route nightly. Patient states he takes 40 ml every night      glipiZIDE SR (GLUCOTROL XL) 5 mg CR tablet Take 1 Tab by mouth two (2) times a day. Qty: 30 Tab, Refills: 0      allopurinol (ZYLOPRIM) 300 mg tablet Take 300 mg by mouth every morning. potassium chloride (K-DUR, KLOR-CON) 10 mEq tablet Take 10 mEq by mouth two (2) times a day. Referenced discharge instructions provided by nursing for diet and activity. Follow-up:  Primary Cardiologist:Dr. Flavio Rivers as scheduled  in 2 weeks  PCP: Emre Crowell MD) in about 4 weeks.     Signed:  Jaquan Roque NP  9/9/2017  9:04 AM

## 2017-09-09 NOTE — PROGRESS NOTES
[facility] CARDIOLOGY PROGRESS NOTE           9/9/2017 8:03 AM    Admit Date: 9/7/2017      Subjective:   Feeling well, wants to be home today. No CP. Doing well on amio gtt. Feeling better after IV lasix. ROS:  Cardiovascular:  As noted above    Objective:      Vitals:    09/08/17 1759 09/08/17 2132 09/09/17 0105 09/09/17 0447   BP: 113/63 100/63 108/69 104/60   Pulse: 70 71 64 62   Resp: 20 24 22 18   Temp: 97 °F (36.1 °C) 97.6 °F (36.4 °C) 97.6 °F (36.4 °C) 97.4 °F (36.3 °C)   SpO2: 95% 94% 96% 97%   Weight:    100.7 kg (221 lb 14.4 oz)       Physical Exam:  General-No Acute Distress  Neck- supple, no JVD  CV- regular rate and rhythm no MRG  Lung- clear bilaterally  Abd- soft, nontender, nondistended  Ext- no edema bilaterally. Skin- warm and dry    Data Review:   Recent Labs      09/09/17   0531  09/08/17   0632   NA  140  140   K  3.4*  3.9   MG  2.0  2.0   BUN  18  18   CREA  0.87  0.85   GLU  117*  111*   WBC  6.9  6.7   HGB  11.8*  11.8*   HCT  35.5*  34.9*   PLT  95*  91*       Assessment/Plan:     Principal Problem:    VT (ventricular tachycardia) (HCC) (9/7/2017)  Better on oral amio    Active Problems:      Chronic systolic heart failure ([facility] [address] (12/12/2013)  EF seems stable by latest echo, continue meds      Atrial fibrillation (HCC) ()  Continue meds    Home today, feeling well and stable for d/c. Home on oral amiodarone, back on home lasix. TC-7 follow up.             Albino Marcial DO  9/9/2017 8:03 AM

## 2017-09-09 NOTE — PROGRESS NOTES
Problem: Falls - Risk of  Goal: *Absence of Falls  Document Ebony Fall Risk and appropriate interventions in the flowsheet.    Outcome: Progressing Towards Goal  Fall Risk Interventions:  Mobility Interventions: Communicate number of staff needed for ambulation/transfer, Patient to call before getting OOB           Medication Interventions: Evaluate medications/consider consulting pharmacy, Patient to call before getting OOB, Teach patient to arise slowly     Elimination Interventions: Call light in reach, Patient to call for help with toileting needs, Toilet paper/wipes in reach, Toileting schedule/hourly rounds, Urinal in reach